# Patient Record
Sex: MALE | Race: ASIAN | NOT HISPANIC OR LATINO | URBAN - METROPOLITAN AREA
[De-identification: names, ages, dates, MRNs, and addresses within clinical notes are randomized per-mention and may not be internally consistent; named-entity substitution may affect disease eponyms.]

---

## 2017-12-18 ENCOUNTER — OUTPATIENT (OUTPATIENT)
Dept: OUTPATIENT SERVICES | Facility: HOSPITAL | Age: 67
LOS: 1 days | End: 2017-12-18
Payer: COMMERCIAL

## 2017-12-18 VITALS
RESPIRATION RATE: 18 BRPM | OXYGEN SATURATION: 98 % | WEIGHT: 145.06 LBS | SYSTOLIC BLOOD PRESSURE: 111 MMHG | DIASTOLIC BLOOD PRESSURE: 59 MMHG | HEART RATE: 75 BPM | TEMPERATURE: 98 F | HEIGHT: 67 IN

## 2017-12-18 DIAGNOSIS — H26.9 UNSPECIFIED CATARACT: Chronic | ICD-10-CM

## 2017-12-18 DIAGNOSIS — H40.9 UNSPECIFIED GLAUCOMA: Chronic | ICD-10-CM

## 2017-12-18 DIAGNOSIS — R94.01 ABNORMAL ELECTROENCEPHALOGRAM [EEG]: ICD-10-CM

## 2017-12-18 DIAGNOSIS — Z95.1 PRESENCE OF AORTOCORONARY BYPASS GRAFT: Chronic | ICD-10-CM

## 2017-12-18 DIAGNOSIS — I25.10 ATHEROSCLEROTIC HEART DISEASE OF NATIVE CORONARY ARTERY WITHOUT ANGINA PECTORIS: Chronic | ICD-10-CM

## 2017-12-18 DIAGNOSIS — Z98.49 CATARACT EXTRACTION STATUS, UNSPECIFIED EYE: Chronic | ICD-10-CM

## 2017-12-18 LAB
ALBUMIN SERPL ELPH-MCNC: 4.1 G/DL — SIGNIFICANT CHANGE UP (ref 3.3–5)
ALP SERPL-CCNC: 50 U/L — SIGNIFICANT CHANGE UP (ref 40–120)
ALT FLD-CCNC: 20 U/L RC — SIGNIFICANT CHANGE UP (ref 10–45)
ANION GAP SERPL CALC-SCNC: 10 MMOL/L — SIGNIFICANT CHANGE UP (ref 5–17)
AST SERPL-CCNC: 23 U/L — SIGNIFICANT CHANGE UP (ref 10–40)
BILIRUB SERPL-MCNC: 0.4 MG/DL — SIGNIFICANT CHANGE UP (ref 0.2–1.2)
BUN SERPL-MCNC: 23 MG/DL — SIGNIFICANT CHANGE UP (ref 7–23)
CALCIUM SERPL-MCNC: 9 MG/DL — SIGNIFICANT CHANGE UP (ref 8.4–10.5)
CHLORIDE SERPL-SCNC: 101 MMOL/L — SIGNIFICANT CHANGE UP (ref 96–108)
CO2 SERPL-SCNC: 26 MMOL/L — SIGNIFICANT CHANGE UP (ref 22–31)
CREAT SERPL-MCNC: 1.03 MG/DL — SIGNIFICANT CHANGE UP (ref 0.5–1.3)
GLUCOSE SERPL-MCNC: 130 MG/DL — HIGH (ref 70–99)
HCT VFR BLD CALC: 38.4 % — LOW (ref 39–50)
HGB BLD-MCNC: 13.3 G/DL — SIGNIFICANT CHANGE UP (ref 13–17)
MCHC RBC-ENTMCNC: 34.2 PG — HIGH (ref 27–34)
MCHC RBC-ENTMCNC: 34.6 GM/DL — SIGNIFICANT CHANGE UP (ref 32–36)
MCV RBC AUTO: 99 FL — SIGNIFICANT CHANGE UP (ref 80–100)
PLATELET # BLD AUTO: 148 K/UL — LOW (ref 150–400)
POTASSIUM SERPL-MCNC: 4.2 MMOL/L — SIGNIFICANT CHANGE UP (ref 3.5–5.3)
POTASSIUM SERPL-SCNC: 4.2 MMOL/L — SIGNIFICANT CHANGE UP (ref 3.5–5.3)
PROT SERPL-MCNC: 7.1 G/DL — SIGNIFICANT CHANGE UP (ref 6–8.3)
RBC # BLD: 3.88 M/UL — LOW (ref 4.2–5.8)
RBC # FLD: 11.3 % — SIGNIFICANT CHANGE UP (ref 10.3–14.5)
SODIUM SERPL-SCNC: 137 MMOL/L — SIGNIFICANT CHANGE UP (ref 135–145)
WBC # BLD: 7.1 K/UL — SIGNIFICANT CHANGE UP (ref 3.8–10.5)
WBC # FLD AUTO: 7.1 K/UL — SIGNIFICANT CHANGE UP (ref 3.8–10.5)

## 2017-12-18 PROCEDURE — 93455 CORONARY ART/GRFT ANGIO S&I: CPT

## 2017-12-18 PROCEDURE — 80053 COMPREHEN METABOLIC PANEL: CPT

## 2017-12-18 PROCEDURE — C1887: CPT

## 2017-12-18 PROCEDURE — 99152 MOD SED SAME PHYS/QHP 5/>YRS: CPT

## 2017-12-18 PROCEDURE — C1769: CPT

## 2017-12-18 PROCEDURE — 85027 COMPLETE CBC AUTOMATED: CPT

## 2017-12-18 PROCEDURE — 93010 ELECTROCARDIOGRAM REPORT: CPT

## 2017-12-18 PROCEDURE — C1894: CPT

## 2017-12-18 PROCEDURE — 93005 ELECTROCARDIOGRAM TRACING: CPT

## 2017-12-18 PROCEDURE — 93455 CORONARY ART/GRFT ANGIO S&I: CPT | Mod: 26

## 2017-12-18 RX ORDER — SODIUM CHLORIDE 9 MG/ML
3 INJECTION INTRAMUSCULAR; INTRAVENOUS; SUBCUTANEOUS EVERY 8 HOURS
Qty: 0 | Refills: 0 | Status: DISCONTINUED | OUTPATIENT
Start: 2017-12-18 | End: 2018-01-02

## 2017-12-18 RX ORDER — METOPROLOL TARTRATE 50 MG
1 TABLET ORAL
Qty: 0 | Refills: 0 | COMMUNITY

## 2017-12-18 NOTE — H&P CARDIOLOGY - PSH
Bilateral cataracts  and glaucoma surgery  2009  CAD S/P percutaneous coronary angioplasty    Glaucoma    S/P CABG x 3  2008  S/P cataract extraction and insertion of intraocular lens

## 2017-12-18 NOTE — H&P CARDIOLOGY - HISTORY OF PRESENT ILLNESS
68 yo male with PMHx of CAD, CABG x3 (Dr. Zapata in 2008), stents in prox circ & distal circ in 2015, brachytherapy in [facility] on 11/2016, HTN, HLD, DM2 (AIC around 7.3 managed by PMD), BPH presents for cardiac cath. pt reports he has been experiencing GAN for a while, evaluated by Dr. Castillo, then referred for cath after seeing decreased EF by Echocardiogram (EF 45-50% minimal MR, mild TR, mild FL, PA systolic pressure 26-31mmHg), reports he gets dizziness and nausea occasionally.

## 2017-12-18 NOTE — H&P CARDIOLOGY - PMH
BPH (benign prostatic hyperplasia)    CAD (coronary artery disease)    Diabetes    Glaucoma    HLD (hyperlipidemia)    HTN (hypertension)    Stented coronary artery

## 2017-12-22 ENCOUNTER — APPOINTMENT (OUTPATIENT)
Dept: CARDIOLOGY | Facility: CLINIC | Age: 67
End: 2017-12-22

## 2018-02-13 ENCOUNTER — APPOINTMENT (OUTPATIENT)
Dept: VASCULAR SURGERY | Facility: CLINIC | Age: 68
End: 2018-02-13
Payer: MEDICARE

## 2018-02-13 ENCOUNTER — APPOINTMENT (OUTPATIENT)
Dept: VASCULAR SURGERY | Facility: CLINIC | Age: 68
End: 2018-02-13

## 2018-02-13 VITALS
TEMPERATURE: 98.5 F | HEART RATE: 64 BPM | HEIGHT: 65 IN | BODY MASS INDEX: 23.32 KG/M2 | DIASTOLIC BLOOD PRESSURE: 82 MMHG | SYSTOLIC BLOOD PRESSURE: 126 MMHG | WEIGHT: 140 LBS

## 2018-02-13 DIAGNOSIS — Z82.49 FAMILY HISTORY OF ISCHEMIC HEART DISEASE AND OTHER DISEASES OF THE CIRCULATORY SYSTEM: ICD-10-CM

## 2018-02-13 DIAGNOSIS — Z86.79 PERSONAL HISTORY OF OTHER DISEASES OF THE CIRCULATORY SYSTEM: ICD-10-CM

## 2018-02-13 DIAGNOSIS — E78.5 HYPERLIPIDEMIA, UNSPECIFIED: ICD-10-CM

## 2018-02-13 DIAGNOSIS — Z86.39 PERSONAL HISTORY OF OTHER ENDOCRINE, NUTRITIONAL AND METABOLIC DISEASE: ICD-10-CM

## 2018-02-13 DIAGNOSIS — E11.42 TYPE 2 DIABETES MELLITUS WITH DIABETIC POLYNEUROPATHY: ICD-10-CM

## 2018-02-13 PROCEDURE — 99202 OFFICE O/P NEW SF 15 MIN: CPT

## 2018-02-13 RX ORDER — LANCETS
EACH MISCELLANEOUS
Qty: 100 | Refills: 0 | Status: ACTIVE | COMMUNITY
Start: 2017-10-23

## 2018-02-13 RX ORDER — BLOOD SUGAR DIAGNOSTIC
STRIP MISCELLANEOUS
Qty: 100 | Refills: 0 | Status: ACTIVE | COMMUNITY
Start: 2017-01-23

## 2018-02-13 RX ORDER — ROSUVASTATIN CALCIUM 40 MG/1
40 TABLET, FILM COATED ORAL
Qty: 90 | Refills: 0 | Status: ACTIVE | COMMUNITY
Start: 2017-07-14

## 2018-02-13 RX ORDER — TAMSULOSIN HYDROCHLORIDE 0.4 MG/1
0.4 CAPSULE ORAL
Qty: 90 | Refills: 0 | Status: ACTIVE | COMMUNITY
Start: 2017-07-14

## 2018-02-13 RX ORDER — RANOLAZINE 1000 MG/1
1000 TABLET, FILM COATED, EXTENDED RELEASE ORAL
Qty: 180 | Refills: 0 | Status: ACTIVE | COMMUNITY
Start: 2017-07-14

## 2018-10-01 ENCOUNTER — INPATIENT (INPATIENT)
Facility: HOSPITAL | Age: 68
LOS: 0 days | Discharge: HOME | DRG: 87 | End: 2018-10-02
Attending: NEUROLOGICAL SURGERY | Admitting: NEUROLOGICAL SURGERY
Payer: COMMERCIAL

## 2018-10-01 VITALS
DIASTOLIC BLOOD PRESSURE: 65 MMHG | HEART RATE: 62 BPM | RESPIRATION RATE: 20 BRPM | OXYGEN SATURATION: 99 % | SYSTOLIC BLOOD PRESSURE: 104 MMHG

## 2018-10-01 DIAGNOSIS — S06.5X9A TRAUMATIC SUBDURAL HEMORRHAGE WITH LOSS OF CONSCIOUSNESS OF UNSPECIFIED DURATION, INITIAL ENCOUNTER: ICD-10-CM

## 2018-10-01 DIAGNOSIS — Z98.49 CATARACT EXTRACTION STATUS, UNSPECIFIED EYE: Chronic | ICD-10-CM

## 2018-10-01 DIAGNOSIS — H40.9 UNSPECIFIED GLAUCOMA: Chronic | ICD-10-CM

## 2018-10-01 DIAGNOSIS — I25.10 ATHEROSCLEROTIC HEART DISEASE OF NATIVE CORONARY ARTERY WITHOUT ANGINA PECTORIS: Chronic | ICD-10-CM

## 2018-10-01 DIAGNOSIS — Z95.1 PRESENCE OF AORTOCORONARY BYPASS GRAFT: Chronic | ICD-10-CM

## 2018-10-01 DIAGNOSIS — H26.9 UNSPECIFIED CATARACT: Chronic | ICD-10-CM

## 2018-10-01 PROBLEM — Z95.5 PRESENCE OF CORONARY ANGIOPLASTY IMPLANT AND GRAFT: Chronic | Status: ACTIVE | Noted: 2017-12-18

## 2018-10-01 LAB
ALBUMIN SERPL ELPH-MCNC: 3.7 G/DL — SIGNIFICANT CHANGE UP (ref 3.3–5)
ALP SERPL-CCNC: 48 U/L — SIGNIFICANT CHANGE UP (ref 40–120)
ALT FLD-CCNC: 32 U/L — SIGNIFICANT CHANGE UP (ref 10–45)
ANION GAP SERPL CALC-SCNC: 12 MMOL/L — SIGNIFICANT CHANGE UP (ref 5–17)
APTT BLD: 34.1 SEC — SIGNIFICANT CHANGE UP (ref 27.5–37.4)
AST SERPL-CCNC: 32 U/L — SIGNIFICANT CHANGE UP (ref 10–40)
BASOPHILS # BLD AUTO: 0.1 K/UL — SIGNIFICANT CHANGE UP (ref 0–0.2)
BASOPHILS NFR BLD AUTO: 0.9 % — SIGNIFICANT CHANGE UP (ref 0–2)
BILIRUB SERPL-MCNC: 0.4 MG/DL — SIGNIFICANT CHANGE UP (ref 0.2–1.2)
BLD GP AB SCN SERPL QL: NEGATIVE — SIGNIFICANT CHANGE UP
BUN SERPL-MCNC: 12 MG/DL — SIGNIFICANT CHANGE UP (ref 7–23)
CALCIUM SERPL-MCNC: 9.2 MG/DL — SIGNIFICANT CHANGE UP (ref 8.4–10.5)
CHLORIDE SERPL-SCNC: 102 MMOL/L — SIGNIFICANT CHANGE UP (ref 96–108)
CO2 SERPL-SCNC: 23 MMOL/L — SIGNIFICANT CHANGE UP (ref 22–31)
CREAT SERPL-MCNC: 1.05 MG/DL — SIGNIFICANT CHANGE UP (ref 0.5–1.3)
EOSINOPHIL # BLD AUTO: 0.2 K/UL — SIGNIFICANT CHANGE UP (ref 0–0.5)
EOSINOPHIL NFR BLD AUTO: 2.6 % — SIGNIFICANT CHANGE UP (ref 0–6)
GLUCOSE SERPL-MCNC: 137 MG/DL — HIGH (ref 70–99)
HCT VFR BLD CALC: 32.5 % — LOW (ref 39–50)
HGB BLD-MCNC: 10.9 G/DL — LOW (ref 13–17)
INR BLD: 1.14 RATIO — SIGNIFICANT CHANGE UP (ref 0.88–1.16)
LYMPHOCYTES # BLD AUTO: 2.2 K/UL — SIGNIFICANT CHANGE UP (ref 1–3.3)
LYMPHOCYTES # BLD AUTO: 32.4 % — SIGNIFICANT CHANGE UP (ref 13–44)
MCHC RBC-ENTMCNC: 31.2 PG — SIGNIFICANT CHANGE UP (ref 27–34)
MCHC RBC-ENTMCNC: 33.4 GM/DL — SIGNIFICANT CHANGE UP (ref 32–36)
MCV RBC AUTO: 93.3 FL — SIGNIFICANT CHANGE UP (ref 80–100)
MONOCYTES # BLD AUTO: 0.6 K/UL — SIGNIFICANT CHANGE UP (ref 0–0.9)
MONOCYTES NFR BLD AUTO: 9.1 % — SIGNIFICANT CHANGE UP (ref 2–14)
NEUTROPHILS # BLD AUTO: 3.7 K/UL — SIGNIFICANT CHANGE UP (ref 1.8–7.4)
NEUTROPHILS NFR BLD AUTO: 55 % — SIGNIFICANT CHANGE UP (ref 43–77)
PLATELET # BLD AUTO: 170 K/UL — SIGNIFICANT CHANGE UP (ref 150–400)
POTASSIUM SERPL-MCNC: 4.1 MMOL/L — SIGNIFICANT CHANGE UP (ref 3.5–5.3)
POTASSIUM SERPL-SCNC: 4.1 MMOL/L — SIGNIFICANT CHANGE UP (ref 3.5–5.3)
PROT SERPL-MCNC: 7.3 G/DL — SIGNIFICANT CHANGE UP (ref 6–8.3)
PROTHROM AB SERPL-ACNC: 12.4 SEC — SIGNIFICANT CHANGE UP (ref 9.8–12.7)
RBC # BLD: 3.48 M/UL — LOW (ref 4.2–5.8)
RBC # FLD: 12.2 % — SIGNIFICANT CHANGE UP (ref 10.3–14.5)
RH IG SCN BLD-IMP: POSITIVE — SIGNIFICANT CHANGE UP
SODIUM SERPL-SCNC: 137 MMOL/L — SIGNIFICANT CHANGE UP (ref 135–145)
WBC # BLD: 6.7 K/UL — SIGNIFICANT CHANGE UP (ref 3.8–10.5)
WBC # FLD AUTO: 6.7 K/UL — SIGNIFICANT CHANGE UP (ref 3.8–10.5)

## 2018-10-01 PROCEDURE — 99285 EMERGENCY DEPT VISIT HI MDM: CPT

## 2018-10-01 PROCEDURE — 70450 CT HEAD/BRAIN W/O DYE: CPT | Mod: 26,77

## 2018-10-01 PROCEDURE — 99221 1ST HOSP IP/OBS SF/LOW 40: CPT

## 2018-10-01 PROCEDURE — 70450 CT HEAD/BRAIN W/O DYE: CPT | Mod: 26

## 2018-10-01 RX ORDER — GLUCAGON INJECTION, SOLUTION 0.5 MG/.1ML
1 INJECTION, SOLUTION SUBCUTANEOUS ONCE
Qty: 0 | Refills: 0 | Status: DISCONTINUED | OUTPATIENT
Start: 2018-10-01 | End: 2018-10-02

## 2018-10-01 RX ORDER — ACETAMINOPHEN 500 MG
650 TABLET ORAL EVERY 6 HOURS
Qty: 0 | Refills: 0 | Status: DISCONTINUED | OUTPATIENT
Start: 2018-10-01 | End: 2018-10-02

## 2018-10-01 RX ORDER — INSULIN LISPRO 100/ML
VIAL (ML) SUBCUTANEOUS AT BEDTIME
Qty: 0 | Refills: 0 | Status: DISCONTINUED | OUTPATIENT
Start: 2018-10-01 | End: 2018-10-02

## 2018-10-01 RX ORDER — ACETAMINOPHEN 500 MG
975 TABLET ORAL ONCE
Qty: 0 | Refills: 0 | Status: COMPLETED | OUTPATIENT
Start: 2018-10-01 | End: 2018-10-01

## 2018-10-01 RX ORDER — PANTOPRAZOLE SODIUM 20 MG/1
40 TABLET, DELAYED RELEASE ORAL
Qty: 0 | Refills: 0 | Status: DISCONTINUED | OUTPATIENT
Start: 2018-10-01 | End: 2018-10-02

## 2018-10-01 RX ORDER — LEVETIRACETAM 250 MG/1
500 TABLET, FILM COATED ORAL EVERY 12 HOURS
Qty: 0 | Refills: 0 | Status: DISCONTINUED | OUTPATIENT
Start: 2018-10-01 | End: 2018-10-02

## 2018-10-01 RX ORDER — DEXTROSE 50 % IN WATER 50 %
25 SYRINGE (ML) INTRAVENOUS ONCE
Qty: 0 | Refills: 0 | Status: DISCONTINUED | OUTPATIENT
Start: 2018-10-01 | End: 2018-10-02

## 2018-10-01 RX ORDER — TRAMADOL HYDROCHLORIDE 50 MG/1
25 TABLET ORAL ONCE
Qty: 0 | Refills: 0 | Status: DISCONTINUED | OUTPATIENT
Start: 2018-10-01 | End: 2018-10-01

## 2018-10-01 RX ORDER — SODIUM CHLORIDE 9 MG/ML
1000 INJECTION, SOLUTION INTRAVENOUS
Qty: 0 | Refills: 0 | Status: DISCONTINUED | OUTPATIENT
Start: 2018-10-01 | End: 2018-10-02

## 2018-10-01 RX ORDER — DEXTROSE 50 % IN WATER 50 %
12.5 SYRINGE (ML) INTRAVENOUS ONCE
Qty: 0 | Refills: 0 | Status: DISCONTINUED | OUTPATIENT
Start: 2018-10-01 | End: 2018-10-02

## 2018-10-01 RX ORDER — LISINOPRIL 2.5 MG/1
2.5 TABLET ORAL DAILY
Qty: 0 | Refills: 0 | Status: DISCONTINUED | OUTPATIENT
Start: 2018-10-01 | End: 2018-10-02

## 2018-10-01 RX ORDER — DOCUSATE SODIUM 100 MG
100 CAPSULE ORAL THREE TIMES A DAY
Qty: 0 | Refills: 0 | Status: DISCONTINUED | OUTPATIENT
Start: 2018-10-01 | End: 2018-10-02

## 2018-10-01 RX ORDER — ACETAMINOPHEN 500 MG
1000 TABLET ORAL ONCE
Qty: 0 | Refills: 0 | Status: COMPLETED | OUTPATIENT
Start: 2018-10-01 | End: 2018-10-01

## 2018-10-01 RX ORDER — MORPHINE SULFATE 50 MG/1
2 CAPSULE, EXTENDED RELEASE ORAL ONCE
Qty: 0 | Refills: 0 | Status: DISCONTINUED | OUTPATIENT
Start: 2018-10-01 | End: 2018-10-01

## 2018-10-01 RX ORDER — ATORVASTATIN CALCIUM 80 MG/1
20 TABLET, FILM COATED ORAL AT BEDTIME
Qty: 0 | Refills: 0 | Status: DISCONTINUED | OUTPATIENT
Start: 2018-10-01 | End: 2018-10-02

## 2018-10-01 RX ORDER — METOPROLOL TARTRATE 50 MG
25 TABLET ORAL DAILY
Qty: 0 | Refills: 0 | Status: DISCONTINUED | OUTPATIENT
Start: 2018-10-01 | End: 2018-10-02

## 2018-10-01 RX ORDER — DEXTROSE 50 % IN WATER 50 %
15 SYRINGE (ML) INTRAVENOUS ONCE
Qty: 0 | Refills: 0 | Status: DISCONTINUED | OUTPATIENT
Start: 2018-10-01 | End: 2018-10-02

## 2018-10-01 RX ORDER — DESMOPRESSIN ACETATE 0.1 MG/1
26 TABLET ORAL ONCE
Qty: 0 | Refills: 0 | Status: COMPLETED | OUTPATIENT
Start: 2018-10-01 | End: 2018-10-01

## 2018-10-01 RX ORDER — DEXTROSE MONOHYDRATE, SODIUM CHLORIDE, AND POTASSIUM CHLORIDE 50; .745; 4.5 G/1000ML; G/1000ML; G/1000ML
1000 INJECTION, SOLUTION INTRAVENOUS
Qty: 0 | Refills: 0 | Status: DISCONTINUED | OUTPATIENT
Start: 2018-10-01 | End: 2018-10-02

## 2018-10-01 RX ORDER — DESMOPRESSIN ACETATE 0.1 MG/1
26 TABLET ORAL ONCE
Qty: 0 | Refills: 0 | Status: DISCONTINUED | OUTPATIENT
Start: 2018-10-01 | End: 2018-10-01

## 2018-10-01 RX ORDER — TIMOLOL 0.5 %
1 DROPS OPHTHALMIC (EYE) DAILY
Qty: 0 | Refills: 0 | Status: DISCONTINUED | OUTPATIENT
Start: 2018-10-01 | End: 2018-10-02

## 2018-10-01 RX ORDER — TAMSULOSIN HYDROCHLORIDE 0.4 MG/1
0.4 CAPSULE ORAL AT BEDTIME
Qty: 0 | Refills: 0 | Status: DISCONTINUED | OUTPATIENT
Start: 2018-10-01 | End: 2018-10-02

## 2018-10-01 RX ORDER — ONDANSETRON 8 MG/1
4 TABLET, FILM COATED ORAL EVERY 6 HOURS
Qty: 0 | Refills: 0 | Status: DISCONTINUED | OUTPATIENT
Start: 2018-10-01 | End: 2018-10-02

## 2018-10-01 RX ORDER — DEXAMETHASONE 0.5 MG/5ML
4 ELIXIR ORAL EVERY 12 HOURS
Qty: 0 | Refills: 0 | Status: DISCONTINUED | OUTPATIENT
Start: 2018-10-01 | End: 2018-10-02

## 2018-10-01 RX ORDER — RANOLAZINE 500 MG/1
1000 TABLET, FILM COATED, EXTENDED RELEASE ORAL
Qty: 0 | Refills: 0 | Status: DISCONTINUED | OUTPATIENT
Start: 2018-10-01 | End: 2018-10-02

## 2018-10-01 RX ORDER — INSULIN LISPRO 100/ML
VIAL (ML) SUBCUTANEOUS
Qty: 0 | Refills: 0 | Status: DISCONTINUED | OUTPATIENT
Start: 2018-10-01 | End: 2018-10-02

## 2018-10-01 RX ORDER — SENNA PLUS 8.6 MG/1
2 TABLET ORAL AT BEDTIME
Qty: 0 | Refills: 0 | Status: DISCONTINUED | OUTPATIENT
Start: 2018-10-01 | End: 2018-10-02

## 2018-10-01 RX ADMIN — TRAMADOL HYDROCHLORIDE 25 MILLIGRAM(S): 50 TABLET ORAL at 16:06

## 2018-10-01 RX ADMIN — Medication 1000 MILLIGRAM(S): at 15:57

## 2018-10-01 RX ADMIN — TRAMADOL HYDROCHLORIDE 25 MILLIGRAM(S): 50 TABLET ORAL at 17:38

## 2018-10-01 RX ADMIN — Medication 400 MILLIGRAM(S): at 13:49

## 2018-10-01 RX ADMIN — DESMOPRESSIN ACETATE 226 MICROGRAM(S): 0.1 TABLET ORAL at 14:40

## 2018-10-01 RX ADMIN — LEVETIRACETAM 500 MILLIGRAM(S): 250 TABLET, FILM COATED ORAL at 20:43

## 2018-10-01 RX ADMIN — Medication 4 MILLIGRAM(S): at 16:52

## 2018-10-01 RX ADMIN — Medication 650 MILLIGRAM(S): at 21:23

## 2018-10-01 RX ADMIN — Medication 650 MILLIGRAM(S): at 20:44

## 2018-10-01 NOTE — ED PROVIDER NOTE - MEDICAL DECISION MAKING DETAILS
67 yo M c PMH of CAD s/p CABG and stents (on ASA and plavix), HTN, HLD presents 1 week s/p mechanical fall with 4 day hx of L temporal HA. Pt slipped on wet surface after getting out of shower, +head trauma, no LOC, no pain other than constant L parietal RUSSO. On exam, /65 VS otherwise wnl, pupils unequal, L pupil 4 mm not reactive to light R pupil 3 mm reactive, this may be 2/2 recent cataract surgery in L eye given no other focal neuro deficits. CTH pending, neuro to see. will reassess 69 yo M c PMH of CAD s/p CABG and stents (on ASA and plavix), HTN, HLD presents 1 week s/p mechanical fall with 4 day hx of L temporal HA. Pt slipped on wet surface after getting out of shower, +head trauma, no LOC, no pain other than constant L parietal RUSSO. On exam, /65 VS otherwise wnl, pupils unequal, L pupil 4 mm not reactive to light R pupil 3 mm reactive, this may be 2/2 recent cataract surgery in L eye given no other focal neuro deficits. CTH pending, neuro to see. will reassess ZR

## 2018-10-01 NOTE — H&P ADULT - ATTENDING COMMENTS
I have personally seen and examined Mr. Thomas. Agree with the above. Discussed options, risks and benefits with the patient and his daughter - Dr. Martin.

## 2018-10-01 NOTE — ED PROVIDER NOTE - PHYSICAL EXAMINATION
spine: no midline c/t/l spinal ttp or stepoffs, from of neck without pain  NEURO: EOMI (CN III, IV, VI), facial sensation intact to light touch in all 3 divisions bilat (CN V), face is symmetric with normal eye closure, eye opening, and smile (CN VII), hearing is normal to rubbing fingers (CN VII), palate elevates symmetrically, phonation is normal (CN IX, X),  shoulder shrug intact bilat (CN XI), tongue is midline with nl movements and no atrophy (CN XII), finger to nose test nl bilat, negative pronator drift bilat,  speech is clear; 5/5 motor strength BUE and BLE: deltoids, biceps, triceps, wrist flexors/extensors, hand , hip flexors, knee flexors/extensors, plantar/dorsiflexors, hallux flexors/extensors; sensation intact to light touch BUE and BLE: C5-T1 and L3-S1; gait wnl  pupils: L pupil 3 mm not reactive to light, R pupil 2 mm reactive to light

## 2018-10-01 NOTE — ED PROVIDER NOTE - OBJECTIVE STATEMENT
69 yo M c PMH of CAD s/p CABG and stents (on ASA and plavix), HTN, HLD presents 1 week s/p mechanical fall with 4 day hx of L temporal HA. Pt slipped on wet surface after getting out of shower, +head trauma, no LOC, no pain other than constant L parietal HA that started after fall somewhat alleviated by tylenol. Pt denies preceding dizziness or CP. Pt had L eye surgery in the past couple weeks for cataracts.    ROS positive: HA, fever 100F, nausea  ROS negative: congestion, pharyngitis, CP, SOB, vomiting, abdominal pain, dyusuria, hematuria

## 2018-10-01 NOTE — ED ADULT NURSE REASSESSMENT NOTE - NS ED NURSE REASSESS COMMENT FT1
patient admittied to NSCU, waiting for bed assignment. pt a/ox3 continuing to verbalizing headache. md aware noted hypotensive: 84/52 repeat /52, md aware, Neurosurgery paged will monitor.

## 2018-10-01 NOTE — ED ADULT NURSE NOTE - OBJECTIVE STATEMENT
68year old male a/ox3 brought in by ambulance from private md office s/p trip and fall about 1 week ago. pt states he was in his bathroom when he slipped and hit his head, denies loc. has been having persistent constant headaches and went he went to md office today, noted with abnormal pupils and sent here. no gross neuro deficits. no change in vision/n/v/change in gait respirations even unlabored no sob/dyspnea daughter at bedside.

## 2018-10-01 NOTE — ED PROVIDER NOTE - PROGRESS NOTE DETAILS
Art NYE: received call from radiology pt has 1.9 SDH with 3 mm R shift neurosurg paged awaiting callback Art NYE: per neurosurg recs ordered 0.4 mcg/kg of DDAVP awaiting consent from pt to administer 1 uplt per neurosurg recs Art NYE: per neurosurg pt does not require plt will admit to neurosurg icu

## 2018-10-01 NOTE — ED ADULT NURSE REASSESSMENT NOTE - NS ED NURSE REASSESS COMMENT FT1
patient verbalizing chest pressure 6/10, Neurosurgery aware and to come down to evaluate. EKG completed. will monitor.

## 2018-10-01 NOTE — H&P ADULT - NSHPPHYSICALEXAM_GEN_ALL_CORE
AOx3, FC, PERRL, EOMI, V1-3 intact, no facial, palate ron symmetric, tongue midline, shrug 5/5  5/5 throughout, no drift  SILT  No clonus or babinski

## 2018-10-01 NOTE — CONSULT NOTE ADULT - SUBJECTIVE AND OBJECTIVE BOX
Neurology Consult  Name  MICHEL CULP    HPI:  Mr. Culp is a 68M with PMH of CAD (s/p CABG in 2008) and stent placement in 2014 (on ASA and Plavix), HTN, and HLD who presented to the ED after having a mechanical fall 1 week ago. The patient reportedly slipped while getting out of the shower. He experienced head trauma but no LOC; however, he endorsed a L temporal/parietal HA for the past 4 days. His PCP saw the patient this morning and reportedly noted some unsteadiness on his feet, which prompted this hospital visit. Neurology was consulted to evaluate for possible subdural hemorrhage.    The patient was seen at bedside this afternoon and was resting comfortably. He endorsed headache but denied other complaints at this time, including blurry or double vision, ringing in the ears, or pain or weakness of the extremities.     MEDICATIONS  (STANDING):  atorvastatin 20 milliGRAM(s) Oral at bedtime  dexamethasone     Tablet 4 milliGRAM(s) Oral every 12 hours  dextrose 5%. 1000 milliLiter(s) (50 mL/Hr) IV Continuous <Continuous>  dextrose 50% Injectable 12.5 Gram(s) IV Push once  dextrose 50% Injectable 25 Gram(s) IV Push once  dextrose 50% Injectable 25 Gram(s) IV Push once  docusate sodium 100 milliGRAM(s) Oral three times a day  insulin lispro (HumaLOG) corrective regimen sliding scale   SubCutaneous three times a day before meals  insulin lispro (HumaLOG) corrective regimen sliding scale   SubCutaneous at bedtime  levETIRAcetam 500 milliGRAM(s) Oral every 12 hours  lisinopril 2.5 milliGRAM(s) Oral daily  metoprolol succinate ER 25 milliGRAM(s) Oral daily  pantoprazole    Tablet 40 milliGRAM(s) Oral before breakfast  ranolazine 1000 milliGRAM(s) Oral two times a day  sodium chloride 0.9% with potassium chloride 20 mEq/L 1000 milliLiter(s) (75 mL/Hr) IV Continuous <Continuous>  tamsulosin 0.4 milliGRAM(s) Oral at bedtime  timolol 0.5% Solution 1 Drop(s) Both EYES daily  traMADol 25 milliGRAM(s) Oral once    MEDICATIONS  (PRN):  acetaminophen   Tablet .. 650 milliGRAM(s) Oral every 6 hours PRN Temp greater or equal to 38C (100.4F), Mild Pain (1 - 3)  dextrose 40% Gel 15 Gram(s) Oral once PRN Blood Glucose LESS THAN 70 milliGRAM(s)/deciliter  glucagon  Injectable 1 milliGRAM(s) IntraMuscular once PRN Glucose LESS THAN 70 milligrams/deciliter  ondansetron   Disintegrating Tablet 4 milliGRAM(s) Oral every 6 hours PRN Nausea  senna 2 Tablet(s) Oral at bedtime PRN Constipation    ALLERGIES  NKDA    PSH:  Cataract surgery (2009)    Objective:   Vital Signs Last 24 Hrs  T(C): 36.8 (01 Oct 2018 12:24), Max: 36.8 (01 Oct 2018 12:24)  T(F): 98.2 (01 Oct 2018 12:24), Max: 98.2 (01 Oct 2018 12:24)  HR: 66 (01 Oct 2018 14:49) (62 - 66)  BP: 106/73 (01 Oct 2018 14:49) (104/65 - 116/65)  BP(mean): --  RR: 17 (01 Oct 2018 14:49) (17 - 20)  SpO2: 100% (01 Oct 2018 14:49) (99% - 100%)    General Exam:   General appearance: No acute distress; well-nourished                   Neurological Exam:  Mental Status: AAOx3, fluent speech, follows commands  Cranial Nerves: L pupil larger than right; EOMI, PERRL, V1-V3 intact, facial symmetry intact, no dysarthria  Motor: 5/5 throughout. No drift x4  Sensation: Intact to LT throughout  Coordination: FTN intact b/l  Reflexes: trace bilateral biceps, brachioradialis, patellar and ankle    Labs:    10-01    137  |  102  |  12  ----------------------------<  137<H>  4.1   |  23  |  1.05    Ca    9.2      01 Oct 2018 13:27    TPro  7.3  /  Alb  3.7  /  TBili  0.4  /  DBili  x   /  AST  32  /  ALT  32  /  AlkPhos  48  10-01    LIVER FUNCTIONS - ( 01 Oct 2018 13:27 )  Alb: 3.7 g/dL / Pro: 7.3 g/dL / ALK PHOS: 48 U/L / ALT: 32 U/L / AST: 32 U/L / GGT: x           CBC Full  -  ( 01 Oct 2018 13:27 )  WBC Count : 6.7 K/uL  Hemoglobin : 10.9 g/dL  Hematocrit : 32.5 %  Platelet Count - Automated : 170 K/uL  Mean Cell Volume : 93.3 fl  Mean Cell Hemoglobin : 31.2 pg  Mean Cell Hemoglobin Concentration : 33.4 gm/dL  Auto Neutrophil # : 3.7 K/uL  Auto Lymphocyte # : 2.2 K/uL  Auto Monocyte # : 0.6 K/uL  Auto Eosinophil # : 0.2 K/uL  Auto Basophil # : 0.1 K/uL  Auto Neutrophil % : 55.0 %  Auto Lymphocyte % : 32.4 %  Auto Monocyte % : 9.1 %  Auto Eosinophil % : 2.6 %  Auto Basophil % : 0.9 %      Radiology  CTH: "Acute on chronic left frontal parietal and temporal subdural hematoma measuring up to 1.9 cm in greatest thickness and resulting in rightward shift of 3 mm."

## 2018-10-01 NOTE — H&P ADULT - HISTORY OF PRESENT ILLNESS
67 yo M c PMH of CAD s/p CABG 2008 and stents 2014 (on ASA and plavix last taken 9/30/2018), HTN, HLD presents s/p mech fall 1 week prior with 4 day hx of L temporal HA. Pt slipped on wet surface after getting out of shower, +head trauma, no LOC, no pain other than constant L parietal HA that started after fall somewhat alleviated by tylenol. Pt denies preceding dizziness or CP. Patient was seen by PMD today who was concerned that he was unsteady on his feet so he was sent into the hospital.

## 2018-10-01 NOTE — CONSULT NOTE ADULT - SUBJECTIVE AND OBJECTIVE BOX
CHIEF COMPLAINT: Headache    HISTORY OF PRESENT ILLNESS: 67 yo male w h/o CAD s/p CABG (), HTN, HLD who presented to ED after mechanical slip and fall one week ago with headache. Pt reports he was in the shower when he slipped and hit his head. He denies any loss of consciousness. He did not feel dizzy or lightheaded prior to the fall. He came to the ED because of worsening headache. He reports compliance with his home medications.      Allergies    No Known Allergies    Intolerances    	    MEDICATIONS:  lisinopril 2.5 milliGRAM(s) Oral daily  metoprolol succinate ER 25 milliGRAM(s) Oral daily  ranolazine 1000 milliGRAM(s) Oral two times a day  tamsulosin 0.4 milliGRAM(s) Oral at bedtime        acetaminophen   Tablet .. 650 milliGRAM(s) Oral every 6 hours PRN  levETIRAcetam 500 milliGRAM(s) Oral every 12 hours  ondansetron   Disintegrating Tablet 4 milliGRAM(s) Oral every 6 hours PRN    docusate sodium 100 milliGRAM(s) Oral three times a day  pantoprazole    Tablet 40 milliGRAM(s) Oral before breakfast  senna 2 Tablet(s) Oral at bedtime PRN    atorvastatin 20 milliGRAM(s) Oral at bedtime  dexamethasone     Tablet 4 milliGRAM(s) Oral every 12 hours  dextrose 40% Gel 15 Gram(s) Oral once PRN  dextrose 50% Injectable 12.5 Gram(s) IV Push once  dextrose 50% Injectable 25 Gram(s) IV Push once  dextrose 50% Injectable 25 Gram(s) IV Push once  glucagon  Injectable 1 milliGRAM(s) IntraMuscular once PRN  insulin lispro (HumaLOG) corrective regimen sliding scale   SubCutaneous three times a day before meals  insulin lispro (HumaLOG) corrective regimen sliding scale   SubCutaneous at bedtime    dextrose 5%. 1000 milliLiter(s) IV Continuous <Continuous>  sodium chloride 0.9% with potassium chloride 20 mEq/L 1000 milliLiter(s) IV Continuous <Continuous>  timolol 0.5% Solution 1 Drop(s) Both EYES daily      PAST MEDICAL & SURGICAL HISTORY:  Stented coronary artery  Glaucoma  BPH (benign prostatic hyperplasia)  HLD (hyperlipidemia)  HTN (hypertension)  Diabetes  CAD (coronary artery disease)  Glaucoma  CAD S/P percutaneous coronary angioplasty  S/P cataract extraction and insertion of intraocular lens  Bilateral cataracts: and glaucoma surgery    S/P CABG x 3: 2008      FAMILY HISTORY:  Family history of heart attack (Father):  age 73      SOCIAL HISTORY:    Non-smoker        REVIEW OF SYSTEMS:  General: no fatigue/malaise, weight loss/gain.  Skin: no rashes.  Ophthalmologic: no blurred vision, no loss of vision. 	  ENT: no sore throat, rhinorrhea, sinus congestion.  Respiratory: no SOB, cough or wheeze.  Gastrointestinal:  no N/V/D, no melena/hematemesis/hematochezia.  Genitourinary: no dysuria/hesitancy or hematuria.  Musculoskeletal: no myalgias or arthralgias.  Neurological: Headache. no changes in vision or hearing, no lightheadedness/dizziness, no syncope/near syncope	  Psychiatric: no unusual stress/anxiety.   Hematology/Lymphatics: no unusual bleeding, bruising and no lymphadenopathy.  Endocrine: no unusual thirst.   All others negative except as stated above and in HPI.    PHYSICAL EXAM:  T(C): 36.8 (10-01-18 @ 12:24), Max: 36.8 (10-01-18 @ 12:24)  HR: 66 (10-01-18 @ 14:49) (62 - 66)  BP: 106/73 (10-01-18 @ 14:49) (104/65 - 116/65)  RR: 17 (10-01-18 @ 14:49) (17 - 20)  SpO2: 100% (10-01-18 @ 14:49) (99% - 100%)  Wt(kg): --  I&O's Summary      Appearance: Normal	  HEENT:   Normal oral mucosa, PERRL, EOMI. b/l suborbital ecchymoses	  Lymphatic: No lymphadenopathy  Cardiovascular: Normal S1 S2, No JVD, No murmurs, No edema  Respiratory: Lungs clear to auscultation	  Psychiatry: A & O x 3, Mood & affect appropriate  Gastrointestinal:  Soft, Non-tender, + BS	  Skin: No rashes, No ecchymoses, No cyanosis	  Neurologic: Non-focal  Extremities: Normal range of motion, No clubbing, cyanosis or edema  Vascular: Peripheral pulses palpable 2+ bilaterally        LABS:	 	    CBC Full  -  ( 01 Oct 2018 13:27 )  WBC Count : 6.7 K/uL  Hemoglobin : 10.9 g/dL  Hematocrit : 32.5 %  Platelet Count - Automated : 170 K/uL  Mean Cell Volume : 93.3 fl  Mean Cell Hemoglobin : 31.2 pg  Mean Cell Hemoglobin Concentration : 33.4 gm/dL  Auto Neutrophil # : 3.7 K/uL  Auto Lymphocyte # : 2.2 K/uL  Auto Monocyte # : 0.6 K/uL  Auto Eosinophil # : 0.2 K/uL  Auto Basophil # : 0.1 K/uL  Auto Neutrophil % : 55.0 %  Auto Lymphocyte % : 32.4 %  Auto Monocyte % : 9.1 %  Auto Eosinophil % : 2.6 %  Auto Basophil % : 0.9 %    10-01    137  |  102  |  12  ----------------------------<  137<H>  4.1   |  23  |  1.05    Ca    9.2      01 Oct 2018 13:27    TPro  7.3  /  Alb  3.7  /  TBili  0.4  /  DBili  x   /  AST  32  /  ALT  32  /  AlkPhos  48  10-01      proBNP:   Lipid Profile:   HgA1c:   TSH:       CARDIAC MARKERS:            TELEMETRY: 	    ECG:  	Sinus rhythm HR  79 T wave inv II, V4-V6  RADIOLOGY: < from: CT Head No Cont (10.01.18 @ 12:46) >  Impression:    Acute on chronic left frontal parietal and temporal subdural hematoma   measuring up to 1.9 cm in greatest thickness and resulting in rightward   shift of 3 mm.    < end of copied text >    OTHER: 	    PREVIOUS DIAGNOSTIC TESTING:    [ ] Echocardiogram:   [ ]  Catheterization: < from: Cardiac Cath Lab - Adult (17 @ 12:50) >  CORONARY VESSELS: The coronary circulation is right dominant.  LM:   --  LM: Angiography showed moderate atherosclerosis.  LAD:   --  Proximal LAD: There was a 100 % stenosis.  CX:   --  Proximal circumflex: There was a 100 % stenosis.  RCA:   --  Proximal RCA: There was a 100 % stenosis.  GRAFTS:   --  Graft to the LAD: The graft was a LIMA. Graft angiography  showed no evidence of disease.    < end of copied text >    [ ] Stress Test: CHIEF COMPLAINT: Headache    HISTORY OF PRESENT ILLNESS: 67 yo male w h/o CAD s/p CABG (), HTN, HLD who presented to ED after mechanical slip and fall one week ago with headache. Pt reports he was in the shower when he slipped and hit his head. He denies any loss of consciousness. He did not feel dizzy or lightheaded prior to the fall. He came to the ED because of worsening headache. He reports compliance with his home medications.    Allergies  No Known Allergies    MEDICATIONS:  lisinopril 2.5 milliGRAM(s) Oral daily  metoprolol succinate ER 25 milliGRAM(s) Oral daily  ranolazine 1000 milliGRAM(s) Oral two times a day  tamsulosin 0.4 milliGRAM(s) Oral at bedtime    acetaminophen   Tablet .. 650 milliGRAM(s) Oral every 6 hours PRN  levETIRAcetam 500 milliGRAM(s) Oral every 12 hours  ondansetron   Disintegrating Tablet 4 milliGRAM(s) Oral every 6 hours PRN    docusate sodium 100 milliGRAM(s) Oral three times a day  pantoprazole    Tablet 40 milliGRAM(s) Oral before breakfast  senna 2 Tablet(s) Oral at bedtime PRN    atorvastatin 20 milliGRAM(s) Oral at bedtime  dexamethasone     Tablet 4 milliGRAM(s) Oral every 12 hours  dextrose 40% Gel 15 Gram(s) Oral once PRN  dextrose 50% Injectable 12.5 Gram(s) IV Push once  dextrose 50% Injectable 25 Gram(s) IV Push once  dextrose 50% Injectable 25 Gram(s) IV Push once  glucagon  Injectable 1 milliGRAM(s) IntraMuscular once PRN  insulin lispro (HumaLOG) corrective regimen sliding scale   SubCutaneous three times a day before meals  insulin lispro (HumaLOG) corrective regimen sliding scale   SubCutaneous at bedtime    dextrose 5%. 1000 milliLiter(s) IV Continuous <Continuous>  sodium chloride 0.9% with potassium chloride 20 mEq/L 1000 milliLiter(s) IV Continuous <Continuous>  timolol 0.5% Solution 1 Drop(s) Both EYES daily    PAST MEDICAL & SURGICAL HISTORY:  Stented coronary artery  Glaucoma  BPH (benign prostatic hyperplasia)  HLD (hyperlipidemia)  HTN (hypertension)  Diabetes  CAD (coronary artery disease)  Glaucoma  CAD S/P percutaneous coronary angioplasty  S/P cataract extraction and insertion of intraocular lens  Bilateral cataracts: and glaucoma surgery    S/P CABG x 3: 2008    FAMILY HISTORY:  Family history of heart attack (Father):  age 73    SOCIAL HISTORY:    Non-smoker    REVIEW OF SYSTEMS:  General: no fatigue/malaise, weight loss/gain.  Skin: no rashes.  Ophthalmologic: no blurred vision, no loss of vision. 	  ENT: no sore throat, rhinorrhea, sinus congestion. Positive bruising under eyes.   Respiratory: no SOB, cough or wheeze.  Gastrointestinal:  no N/V/D, no melena/hematemesis/hematochezia.  Genitourinary: no dysuria/hesitancy or hematuria.  Musculoskeletal: no myalgias or arthralgias.  Neurological: Headache. no changes in vision or hearing, no lightheadedness/dizziness, no syncope/near syncope	  Psychiatric: no unusual stress/anxiety.   Hematology/Lymphatics: no unusual bleeding, bruising and no lymphadenopathy.  Endocrine: no unusual thirst.   All others negative except as stated above and in HPI.    PHYSICAL EXAM:  T(C): 36.8 (10-01-18 @ 12:24), Max: 36.8 (10-01-18 @ 12:24)  HR: 66 (10-01-18 @ 14:49) (62 - 66)  BP: 106/73 (10-01-18 @ 14:49) (104/65 - 116/65)  RR: 17 (10-01-18 @ 14:49) (17 - 20)  SpO2: 100% (10-01-18 @ 14:49) (99% - 100%)    Appearance: Normal	  HEENT:   Normal oral mucosa,  b/l suborbital ecchymoses	  Lymphatic: No lymphadenopathy  Cardiovascular: Normal S1 S2, No JVD,  No edema  Respiratory: Lungs clear to auscultation	  Psychiatry: A & O x 3,   Gastrointestinal:  Soft, Non-tender	  Skin: No rashes, No ecchymoses, No cyanosis	  Neurologic: Non-focal  Extremities: No clubbing, cyanosis or edema  Vascular: Peripheral pulses palpable 2+ bilaterally    LABS:	 	    CBC Full  -  ( 01 Oct 2018 13:27 )  WBC Count : 6.7 K/uL  Hemoglobin : 10.9 g/dL  Hematocrit : 32.5 %  Platelet Count - Automated : 170 K/uL  Mean Cell Volume : 93.3 fl  Mean Cell Hemoglobin : 31.2 pg  Mean Cell Hemoglobin Concentration : 33.4 gm/dL  Auto Neutrophil # : 3.7 K/uL  Auto Lymphocyte # : 2.2 K/uL  Auto Monocyte # : 0.6 K/uL  Auto Eosinophil # : 0.2 K/uL  Auto Basophil # : 0.1 K/uL  Auto Neutrophil % : 55.0 %  Auto Lymphocyte % : 32.4 %  Auto Monocyte % : 9.1 %  Auto Eosinophil % : 2.6 %  Auto Basophil % : 0.9 %    10-01    137  |  102  |  12  ----------------------------<  137<H>  4.1   |  23  |  1.05    Ca    9.2      01 Oct 2018 13:27    TPro  7.3  /  Alb  3.7  /  TBili  0.4  /  DBili  x   /  AST  32  /  ALT  32  /  AlkPhos  48  10-01    CARDIAC MARKERS:      TELEMETRY: 	    ECG:  	Sinus rhythm HR  79 T wave inv II, V4-V6    RADIOLOGY:   < from: CT Head No Cont (10.01.18 @ 12:46) >  Impression:    Acute on chronic left frontal parietal and temporal subdural hematoma   measuring up to 1.9 cm in greatest thickness and resulting in rightward   shift of 3 mm.    < end of copied text >    PREVIOUS DIAGNOSTIC TESTING:    Catheterization: < from: Cardiac Cath Lab - Adult (17 @ 12:50) >  CORONARY VESSELS: The coronary circulation is right dominant.  LM:   --  LM: Angiography showed moderate atherosclerosis.  LAD:   --  Proximal LAD: There was a 100 % stenosis.  CX:   --  Proximal circumflex: There was a 100 % stenosis.  RCA:   --  Proximal RCA: There was a 100 % stenosis.  GRAFTS:   --  Graft to the LAD: The graft was a LIMA. Graft angiography  showed no evidence of disease.    < end of copied text >

## 2018-10-01 NOTE — H&P ADULT - ASSESSMENT
68[facility] and [facility]  - admit to nsgy under Dr. Slade  - [facility] in 4 hours for stability and in 24 hours  - Dec 4 q12 for   - Cardiology eval for holding asa and plavix   - q4hr neurochecks 68M on asa and plavix s/p mech fall 1 week prior here with left sided acute SDH  - Admit to nsgy under Dr. Slade  - [facility] in 4 hours for stability and in 24 hours  - Dec 4 q12 for SDH and headaches  - Cardiology eval for holding asa and plavix   - q4hr neurochecks

## 2018-10-01 NOTE — CONSULT NOTE ADULT - ATTENDING COMMENTS
67 yo male w h/o extensive CAD s/p CABG (2008), HTN, HLD presenting with SHD s/p fall one week prior.  Cardiac pathology includes 100% stenosis of all three vessels LAD, RCA, and LCx with patent LIMA to LAD. Aspirin and clopidogrel held in the setting of new SDH. Recommend resuming aspirin 81 mg daily once stable from a neurologic standpoint, and ideally resuming clopidogrel at a later date given severe CAD. Continue ACEi, BB, ranolazine, increase statin. Will follow along.

## 2018-10-01 NOTE — CONSULT NOTE ADULT - ASSESSMENT
Assessment:  Mr. Thomas is a 68M with PMH of CAD (s/p CABG in 2008) and stent placement in 2014 (on ASA and Plavix), HTN, and HLD who presented to the ED after having a mechanical fall 1 week ago in which he experienced head trauma but no LOC. He has endorsed a L temporal HA for the past 4 days. His PCP saw him this morning and reportedly noted some unsteadiness of gait, which prompted this hospital visit. Neurology was consulted to evaluate for possible subdural hemorrhage. CT of the head confirmed an acute on chronic L frontal, parietal, and temporal subdural hemorrhage.     Plan:  - Admit patient   - Repeat CT scan in at least 12 hours to monitor size of subdural hemorrhage  - Will follow    Note written by Katherine Gaspar, Medical Student ([employer]).  Case discussed with Dr. Barraza. Assessment:  Mr. Thomas is a 68M with PMH of CAD (s/p CABG in 2008) and stent placement in 2014 (on ASA and Plavix), HTN, and HLD who presented to the ED after having a mechanical fall 1 week ago in which he experienced head trauma but no LOC. He has endorsed a L temporal HA for the past 4 days. His PCP saw him this morning and reportedly noted some unsteadiness of gait, which prompted this hospital visit. Neurology was consulted to evaluate for possible subdural hemorrhage. CT of the head confirmed an acute Left parietal, and temporal subdural hemorrhage.     Plan:  - Patient also seen by Neurosurgery/ Agree with their plan of care  - Repeat CT scan in at least 12 hours to monitor size of subdural hemorrhage  - Will follow up tomorrow    Note written by Katherine Gaspar, Medical Student ([employer]).  Case discussed with Dr. Barraza.  Case dw her daughter, Dr. Pires

## 2018-10-01 NOTE — ED ADULT NURSE NOTE - INTERVENTIONS DEFINITIONS
Stretcher in lowest position, wheels locked, appropriate side rails in place/Monitor gait and stability/[place] to call system/Call bell, personal items and telephone within reach/Instruct patient to call for assistance/Physically safe environment: no spills, clutter or unnecessary equipment

## 2018-10-02 ENCOUNTER — TRANSCRIPTION ENCOUNTER (OUTPATIENT)
Age: 68
End: 2018-10-02

## 2018-10-02 VITALS
SYSTOLIC BLOOD PRESSURE: 125 MMHG | OXYGEN SATURATION: 99 % | TEMPERATURE: 98 F | HEART RATE: 62 BPM | DIASTOLIC BLOOD PRESSURE: 63 MMHG | RESPIRATION RATE: 18 BRPM

## 2018-10-02 LAB
GLUCOSE BLDC GLUCOMTR-MCNC: 134 MG/DL — HIGH (ref 70–99)
GLUCOSE BLDC GLUCOMTR-MCNC: 231 MG/DL — HIGH (ref 70–99)
GLUCOSE BLDC GLUCOMTR-MCNC: 361 MG/DL — HIGH (ref 70–99)
HBA1C BLD-MCNC: 7.2 % — HIGH (ref 4–5.6)

## 2018-10-02 PROCEDURE — 99222 1ST HOSP IP/OBS MODERATE 55: CPT

## 2018-10-02 PROCEDURE — 70450 CT HEAD/BRAIN W/O DYE: CPT | Mod: 26

## 2018-10-02 PROCEDURE — 99239 HOSP IP/OBS DSCHRG MGMT >30: CPT

## 2018-10-02 RX ORDER — ASPIRIN/CALCIUM CARB/MAGNESIUM 324 MG
1 TABLET ORAL
Qty: 0 | Refills: 0 | COMMUNITY

## 2018-10-02 RX ORDER — SENNA PLUS 8.6 MG/1
2 TABLET ORAL
Qty: 0 | Refills: 0 | DISCHARGE
Start: 2018-10-02

## 2018-10-02 RX ORDER — ACETAMINOPHEN 500 MG
1000 TABLET ORAL ONCE
Qty: 0 | Refills: 0 | Status: COMPLETED | OUTPATIENT
Start: 2018-10-02 | End: 2018-10-02

## 2018-10-02 RX ORDER — DEXAMETHASONE 0.5 MG/5ML
1 ELIXIR ORAL
Qty: 20 | Refills: 0
Start: 2018-10-02 | End: 2018-10-11

## 2018-10-02 RX ORDER — TRAMADOL HYDROCHLORIDE 50 MG/1
1 TABLET ORAL
Qty: 20 | Refills: 0
Start: 2018-10-02 | End: 2018-10-06

## 2018-10-02 RX ORDER — TRAMADOL HYDROCHLORIDE 50 MG/1
50 TABLET ORAL EVERY 6 HOURS
Qty: 0 | Refills: 0 | Status: DISCONTINUED | OUTPATIENT
Start: 2018-10-02 | End: 2018-10-02

## 2018-10-02 RX ORDER — INFLUENZA VIRUS VACCINE 15; 15; 15; 15 UG/.5ML; UG/.5ML; UG/.5ML; UG/.5ML
0.5 SUSPENSION INTRAMUSCULAR ONCE
Qty: 0 | Refills: 0 | Status: DISCONTINUED | OUTPATIENT
Start: 2018-10-02 | End: 2018-10-02

## 2018-10-02 RX ORDER — DOCUSATE SODIUM 100 MG
1 CAPSULE ORAL
Qty: 0 | Refills: 0 | DISCHARGE
Start: 2018-10-02

## 2018-10-02 RX ORDER — TRAMADOL HYDROCHLORIDE 50 MG/1
1 TABLET ORAL
Qty: 20 | Refills: 0 | OUTPATIENT
Start: 2018-10-02 | End: 2018-10-06

## 2018-10-02 RX ORDER — ACETAMINOPHEN 500 MG
2 TABLET ORAL
Qty: 0 | Refills: 0 | DISCHARGE
Start: 2018-10-02

## 2018-10-02 RX ORDER — LEVETIRACETAM 250 MG/1
1 TABLET, FILM COATED ORAL
Qty: 14 | Refills: 0 | OUTPATIENT
Start: 2018-10-02 | End: 2018-10-08

## 2018-10-02 RX ORDER — ATORVASTATIN CALCIUM 80 MG/1
80 TABLET, FILM COATED ORAL AT BEDTIME
Qty: 0 | Refills: 0 | Status: DISCONTINUED | OUTPATIENT
Start: 2018-10-02 | End: 2018-10-02

## 2018-10-02 RX ORDER — DEXAMETHASONE 0.5 MG/5ML
1 ELIXIR ORAL
Qty: 20 | Refills: 0 | OUTPATIENT
Start: 2018-10-02 | End: 2018-10-11

## 2018-10-02 RX ORDER — LEVETIRACETAM 250 MG/1
1 TABLET, FILM COATED ORAL
Qty: 14 | Refills: 0
Start: 2018-10-02 | End: 2018-10-08

## 2018-10-02 RX ADMIN — RANOLAZINE 1000 MILLIGRAM(S): 500 TABLET, FILM COATED, EXTENDED RELEASE ORAL at 06:59

## 2018-10-02 RX ADMIN — LEVETIRACETAM 500 MILLIGRAM(S): 250 TABLET, FILM COATED ORAL at 06:59

## 2018-10-02 RX ADMIN — Medication 400 MILLIGRAM(S): at 08:45

## 2018-10-02 RX ADMIN — LISINOPRIL 2.5 MILLIGRAM(S): 2.5 TABLET ORAL at 06:59

## 2018-10-02 RX ADMIN — Medication 4 MILLIGRAM(S): at 06:59

## 2018-10-02 RX ADMIN — Medication 1000 MILLIGRAM(S): at 09:40

## 2018-10-02 RX ADMIN — Medication 100 MILLIGRAM(S): at 13:25

## 2018-10-02 RX ADMIN — Medication 10: at 12:48

## 2018-10-02 RX ADMIN — PANTOPRAZOLE SODIUM 40 MILLIGRAM(S): 20 TABLET, DELAYED RELEASE ORAL at 06:59

## 2018-10-02 RX ADMIN — Medication 4: at 09:38

## 2018-10-02 RX ADMIN — Medication 100 MILLIGRAM(S): at 06:59

## 2018-10-02 NOTE — DISCHARGE NOTE ADULT - PATIENT PORTAL LINK FT
You can access the [facility] â€“ [facility] Patient Portal, offered by [facility], by registering with the following website: [email or web address]

## 2018-10-02 NOTE — DISCHARGE NOTE ADULT - CARE PROVIDERS DIRECT ADDRESSES
[email or web address]

## 2018-10-02 NOTE — DISCHARGE NOTE ADULT - NS AS ACTIVITY OBS
No Heavy lifting/straining/Walking-Indoors allowed/Showering allowed/Walking-Outdoors allowed/Stairs allowed/Do not make important decisions/Do not drive or operate machinery

## 2018-10-02 NOTE — DISCHARGE NOTE ADULT - PLAN OF CARE
managed conservatively .  .  .  Please make an appointment for follow up with neurosurgeon Dr. Javier Slade within 7-10 days. Call [phone] to schedule an appointment. He will discuss with you the need for possible evacuation of subdural hematoma at follow up appointment.     NO heavy lifting, strenuous activity, twisting, bending, driving, or working until cleared by your physician.   Return to ER immediately for any of the following: worsening headaches, slurred speech, fever, bleeding, new onset numbness/tingling/weakness, nausea and/or vomiting, chest pain, shortness of breath, confusion, seizure, altered mental status, urinary and/or fecal incontinence or retention. .  .  Please make an appointment for follow up with your cardiologist after discharge.     **DO NOT RESTART Aspirin or Plavix until instructed to do so by Dr. Slade.** Please make an appointment for follow up with your primary care physician after discharge. Continue previous anti-hypertensive medications. Please make an appointment for follow up with your primary care physician after discharge. Continue Crestor. Please make an appointment for follow up with your primary care physician after discharge. Continue Janumet as previously taking. Your blood glucose may be elevated while you are on steroids. Please make an appointment for follow up with your primary care physician after discharge. Continue Timolol eye drops.

## 2018-10-02 NOTE — PROGRESS NOTE ADULT - ASSESSMENT
HPI: Patient is a 68 year old male with a past medical history of CAD s/p CABG in 2008 and stents 2014 (on ASA and plavix last taken 9/30/2018), HTN, HLD presents s/p mechanical fall 1 week prior with 4 day hx of L temporal HA. Pt slipped on wet surface after getting out of shower, +head trauma, no LOC, no pain other than constant L parietal HA that started after fall somewhat alleviated by tylenol. Pt denies preceding dizziness or CP. Patient was seen by PMD today who was concerned that he was unsteady on his feet so he was sent into the hospital. (01 Oct 2018 14:10)      PLAN:   -Repeat CT head this morning  -Continue decadron 4mg every 12 hours for brain compression from subdural hematoma  -Continue Keppra 500 q12h for seizure prophylaxis  -Continue Tramadol 50q6 PRN for pain control   -Continue to hold aspirin and plavix in setting of acute SDH  -Continue Lisinopril 2.5 QD, Metoprolol XL 25 daily, and Ranolazine 1g BID for hx of HTN; increased Lipitor to 80mg qHS  -Continue HISS for hx of T2DM; anticipate hyperglycemia due to steroids  -Encouraged mobilization with assistance  -Incentive spirometer  -DVT prophylaxis: bilateral venodynes; off chemoprophlyaxis due to acute SDH  -Dispo: anticipate discharge home today with outpatient follow up if repeat CT stable  -Discussed with Dr. Berlin Lopes #[id number]

## 2018-10-02 NOTE — DISCHARGE NOTE ADULT - REASON FOR ADMISSION
Admitted 10/2 with worsening headaches, found with left acute subdural hematoma Admitted 10/2 with worsening headaches, found to have a left acute subdural hematoma

## 2018-10-02 NOTE — DISCHARGE NOTE ADULT - MEDICATION SUMMARY - MEDICATIONS TO STOP TAKING
I will STOP taking the medications listed below when I get home from the hospital:    clopidogrel 75 mg oral tablet  -- 1 tab(s) by mouth once a day    Ecotrin Adult Low Strength 81 mg oral delayed release tablet  -- 1 tab(s) by mouth once a day

## 2018-10-02 NOTE — DISCHARGE NOTE ADULT - SECONDARY DIAGNOSIS.
CAD (coronary artery disease) HTN (hypertension) HLD (hyperlipidemia) Type 2 diabetes mellitus without complication, without long-term current use of insulin Glaucoma

## 2018-10-02 NOTE — PROGRESS NOTE ADULT - SUBJECTIVE AND OBJECTIVE BOX
SUBJECTIVE: Patient seen and examined with wife and daughter at bedside. Complaining of headaches, improved with pain medication. Denies nausea, vomiting, vision changes, chest pain, shortness of breath.     Vital Signs Last 24 Hrs  T(C): 37 (10-02-18 @ 08:26), Max: 37 (10-02-18 @ 08:26)  T(F): 98.6 (10-02-18 @ 08:26), Max: 98.6 (10-02-18 @ 08:26)  HR: 66 (10-02-18 @ 08:26) (61 - 81)  BP: 135/62 (10-02-18 @ 08:26) (84/52 - 159/71)  RR: 18 (10-02-18 @ 08:26) (16 - 20)  SpO2: 96% (10-02-18 @ 08:26) (96% - 100%)    PHYSICAL EXAM:    Constitutional: No Acute Distress, resting comfortably in bed    Neurological: Awake, alert, oriented to person, place and time, speech clear and fluent, decreased nasolabial fold on right, tongue midline, briskly following commands, mild RUE drift, moves all extremities: RUE 4+/5, LUE 5/5, RLE 4+/5, LLE 5/5, sensation intact to light touch throughout, Rt pupils 4mm and reactive, left pupil surgical 3mm and nonreactive , extraocular movements intact, no nystagmus    Pulmonary: Clear to Auscultation, No rales, No rhonchi, No wheezes     Cardiovascular: S1, S2, Regular rate and rhythm     Gastrointestinal: Soft, Non-tender, Non-distended, +bowel sounds x 4    Extremities: No calf tenderness bilaterally, no cyanosis, clubbing or edema      LABS:                          10.9   6.7   )-----------( 170      ( 01 Oct 2018 13:27 )             32.5    10-01    137  |  102  |  12  ----------------------------<  137<H>  4.1   |  23  |  1.05    Ca    9.2      01 Oct 2018 13:27    TPro  7.3  /  Alb  3.7  /  TBili  0.4  /  DBili  x   /  AST  32  /  ALT  32  /  AlkPhos  48  10-01  PT/INR - ( 01 Oct 2018 13:27 )   PT: 12.4 sec;   INR: 1.14 ratio         PTT - ( 01 Oct 2018 13:27 )  PTT:34.1 sec      IMAGING:     < from: CT Head No Cont (10.01.18 @ 16:12) >  IMPRESSION:    Stable left frontal parietal temporal subdural hematoma.    < end of copied text >      < from: CT Head No Cont (10.01.18 @ 12:46) >  Impression:    Acute on chronic left frontal parietal and temporal subdural hematoma   measuring up to 1.9 cm in greatest thickness and resulting in rightward   shift of 3 mm.    < end of copied text >        MEDICATIONS:  Antibiotics:    Neuro:  acetaminophen   Tablet .. 650 milliGRAM(s) Oral every 6 hours PRN Temp greater or equal to 38C (100.4F), Mild Pain (1 - 3)  levETIRAcetam 500 milliGRAM(s) Oral every 12 hours  ondansetron   Disintegrating Tablet 4 milliGRAM(s) Oral every 6 hours PRN Nausea  traMADol 50 milliGRAM(s) Oral every 6 hours PRN Moderate Pain (4 - 6)    Cardiac:  lisinopril 2.5 milliGRAM(s) Oral daily  metoprolol succinate ER 25 milliGRAM(s) Oral daily  ranolazine 1000 milliGRAM(s) Oral two times a day  tamsulosin 0.4 milliGRAM(s) Oral at bedtime    Pulm:    GI/:  docusate sodium 100 milliGRAM(s) Oral three times a day  pantoprazole    Tablet 40 milliGRAM(s) Oral before breakfast  senna 2 Tablet(s) Oral at bedtime PRN Constipation    Other:   atorvastatin 80 milliGRAM(s) Oral at bedtime  dexamethasone     Tablet 4 milliGRAM(s) Oral every 12 hours  dextrose 40% Gel 15 Gram(s) Oral once PRN Blood Glucose LESS THAN 70 milliGRAM(s)/deciliter  dextrose 5%. 1000 milliLiter(s) IV Continuous <Continuous>  dextrose 50% Injectable 12.5 Gram(s) IV Push once  dextrose 50% Injectable 25 Gram(s) IV Push once  dextrose 50% Injectable 25 Gram(s) IV Push once  glucagon  Injectable 1 milliGRAM(s) IntraMuscular once PRN Glucose LESS THAN 70 milligrams/deciliter  influenza   Vaccine 0.5 milliLiter(s) IntraMuscular once  insulin lispro (HumaLOG) corrective regimen sliding scale   SubCutaneous three times a day before meals  insulin lispro (HumaLOG) corrective regimen sliding scale   SubCutaneous at bedtime  sodium chloride 0.9% with potassium chloride 20 mEq/L 1000 milliLiter(s) IV Continuous <Continuous>  timolol 0.5% Solution 1 Drop(s) Both EYES daily        DIET: CCD halal

## 2018-10-02 NOTE — DISCHARGE NOTE ADULT - CARE PROVIDER_API CALL
Javier Slade (MD), Neurological Surgery  [address]  [address] [place] [address]  Phone: [phone] (Clinical)  Fax: [phone]    Selena Walters (MD), Internal Medicine  [address]  [address] [place] [address]  Phone: [phone]  Fax: [phone]

## 2018-10-02 NOTE — DISCHARGE NOTE ADULT - ADDITIONAL INSTRUCTIONS
Please make an appointment for follow up with your primary care physician after discharge.   Please make an appointment for follow up with neurosurgeon Dr. Javier Euceda within 7-10 days. Call [phone] to schedule an appointment.   **DO NOT RESTART ASPIRIN OR PLAVIX UNTIL INSTRUCTED TO DO SO BY DR. EUCEDA**

## 2018-10-02 NOTE — DISCHARGE NOTE ADULT - HOSPITAL COURSE
Patient is a 68 year old male with a past medical history of CAD s/p CABG in 2008 and stents 2014 (on ASA and plavix last taken 9/30/2018), HTN, HLD presents s/p mechanical fall 1 week prior to admission with a 4 day history of left temporal HA. CT head was performed and revealed acute 1.8cm left frontoparietal subdural hematoma with midline shift. Patient had 2 repeat CT heads which revealed stable subdural hematoma. He was started on Decadron 4mg every 12 hours and Keppra 500 q12h for seizure prophylaxis. He remained neurologically stable. Given recent anti-coagulation use and stable neurologic exam, patient instructed to follow up as outpatient for re-evaluation. On the day of discharge, he is medically and neurosurgically stable and cleared for discharge home with clear instructions to follow up with Dr. Slade as outpatient in 7-10 days to evaluate for possible evacuation at a later date.     More than 30 minutes were spent educating the patient and family regarding condition, medications, follow up plans, signs and symptoms to be concerned with, preparing paperwork, and questions answered regarding discharge. Patient is a 68 year old male with a past medical history of CAD s/p CABG in 2008 and stents 2014 (on ASA and plavix last taken 9/30/2018), HTN, HLD presents s/p mechanical fall 1 week prior to admission with a 4 day history of left temporal HA. CT head was performed and revealed an acute 1.8cm left frontoparietal subdural hematoma with midline shift. Patient was seen in the ED by Dr. Slade and had 2 repeat CT heads which revealed stable subdural hematoma. He was started on Decadron 4mg every 12 hours and Keppra 500 q12h for seizure prophylaxis. He remained neurologically stable. Given recent anti-coagulation use and stable neurologic exam, patient was instructed to follow up as outpatient for re-evaluation. On the day of discharge, he was medically and neurosurgically stable and cleared for discharge home with clear instructions to follow up with Dr. Slade as outpatient in 7-10 days. He will be evaluated for possible surgical evacuation at a later date.     More than 30 minutes were spent educating the patient and family regarding condition, medications, follow up plans, signs and symptoms to be concerned with, preparing paperwork, and questions answered regarding discharge.

## 2018-10-02 NOTE — DISCHARGE NOTE ADULT - MEDICATION SUMMARY - MEDICATIONS TO TAKE
I will START or STAY ON the medications listed below when I get home from the hospital:    dexamethasone 4 mg oral tablet  -- 1 tab(s) by mouth every 12 hours  -- Indication: For Subdural hematoma    acetaminophen 325 mg oral tablet  -- 2 tab(s) by mouth every 6 hours, As needed, Temp greater or equal to 38C (100.4F), Mild Pain (1 - 3)  -- Indication: For Mild pain    traMADol 50 mg oral tablet  -- 1 tab(s) by mouth every 6 hours, As needed, Moderate Pain (4 - 6) MDD:4 tabs  -- Indication: For Moderate pain    lisinopril 2.5 mg oral tablet  -- 1 tab(s) by mouth once a day  -- Indication: For Hypertension    tamsulosin 0.4 mg oral capsule  -- 1 cap(s) by mouth once a day  -- Indication: For BPH    Ranexa 1000 mg oral tablet, extended release  -- 1 tab(s) by mouth 2 times a day  -- Indication: For Angina    levETIRAcetam 500 mg oral tablet  -- 1 tab(s) by mouth every 12 hours  -- Indication: For Seizure prophylaxis    dapagliflozin 5 mg oral tablet  -- 1 tab(s) by mouth once a day    -- Indication: For Type 2 Diabetes Mellitus    Janumet 1000 mg-50 mg oral tablet  -- 1 tab(s) by mouth 2 times a day may resume 11/19/16    -- Indication: For Type 2 Diabetes Mellitus    Crestor 20 mg oral tablet  -- 1 tab(s) by mouth once a day (at bedtime)  -- Indication: For Hyperlipidemia    metoprolol succinate 25 mg oral tablet, extended release  -- 0.5 tab(s) by mouth once a day  -- Indication: For Hypertension     senna oral tablet  -- 2 tab(s) by mouth once a day (at bedtime), As needed, Constipation  -- Indication: For Constipation    docusate sodium 100 mg oral capsule  -- 1 cap(s) by mouth 3 times a day  -- Indication: For Constipation    timolol hemihydrate 0.5% ophthalmic solution  -- 1 drop(s) to each affected eye 2 times a day  -- Indication: For Glaucoma    omeprazole 20 mg oral delayed release capsule  -- 1 cap(s) by mouth once a day  -- Indication: For GERD    multivitamin with minerals  -- 1 tab(s) by mouth once a day  -- Indication: For Supplemental

## 2018-10-11 ENCOUNTER — OUTPATIENT (OUTPATIENT)
Dept: OUTPATIENT SERVICES | Facility: HOSPITAL | Age: 68
LOS: 1 days | End: 2018-10-11
Payer: COMMERCIAL

## 2018-10-11 ENCOUNTER — APPOINTMENT (OUTPATIENT)
Dept: NEUROSURGERY | Facility: CLINIC | Age: 68
End: 2018-10-11
Payer: MEDICARE

## 2018-10-11 VITALS
WEIGHT: 145 LBS | RESPIRATION RATE: 16 BRPM | HEART RATE: 79 BPM | SYSTOLIC BLOOD PRESSURE: 80 MMHG | DIASTOLIC BLOOD PRESSURE: 45 MMHG | TEMPERATURE: 97.6 F | OXYGEN SATURATION: 98 % | BODY MASS INDEX: 24.16 KG/M2 | HEIGHT: 65 IN

## 2018-10-11 VITALS — SYSTOLIC BLOOD PRESSURE: 103 MMHG | DIASTOLIC BLOOD PRESSURE: 65 MMHG

## 2018-10-11 DIAGNOSIS — I25.10 ATHEROSCLEROTIC HEART DISEASE OF NATIVE CORONARY ARTERY WITHOUT ANGINA PECTORIS: Chronic | ICD-10-CM

## 2018-10-11 DIAGNOSIS — Z98.49 CATARACT EXTRACTION STATUS, UNSPECIFIED EYE: Chronic | ICD-10-CM

## 2018-10-11 DIAGNOSIS — Z71.9 COUNSELING, UNSPECIFIED: ICD-10-CM

## 2018-10-11 DIAGNOSIS — H26.9 UNSPECIFIED CATARACT: Chronic | ICD-10-CM

## 2018-10-11 DIAGNOSIS — Z95.1 PRESENCE OF AORTOCORONARY BYPASS GRAFT: Chronic | ICD-10-CM

## 2018-10-11 DIAGNOSIS — H40.9 UNSPECIFIED GLAUCOMA: Chronic | ICD-10-CM

## 2018-10-11 DIAGNOSIS — S06.5X9A TRAUMATIC SUBDURAL HEMORRHAGE WITH LOSS OF CONSCIOUSNESS OF UNSPECIFIED DURATION, INITIAL ENCOUNTER: ICD-10-CM

## 2018-10-11 PROCEDURE — 99215 OFFICE O/P EST HI 40 MIN: CPT

## 2018-10-11 PROCEDURE — 70450 CT HEAD/BRAIN W/O DYE: CPT | Mod: 26

## 2018-10-11 PROCEDURE — 70450 CT HEAD/BRAIN W/O DYE: CPT

## 2018-10-19 ENCOUNTER — APPOINTMENT (OUTPATIENT)
Dept: NEUROLOGY | Facility: CLINIC | Age: 68
End: 2018-10-19
Payer: MEDICARE

## 2018-10-19 ENCOUNTER — APPOINTMENT (OUTPATIENT)
Dept: NEUROLOGY | Facility: CLINIC | Age: 68
End: 2018-10-19

## 2018-10-19 VITALS
BODY MASS INDEX: 22.76 KG/M2 | DIASTOLIC BLOOD PRESSURE: 60 MMHG | WEIGHT: 145 LBS | SYSTOLIC BLOOD PRESSURE: 88 MMHG | HEIGHT: 67 IN | HEART RATE: 80 BPM

## 2018-10-19 PROCEDURE — 99205 OFFICE O/P NEW HI 60 MIN: CPT

## 2018-10-19 RX ORDER — CLOPIDOGREL BISULFATE 75 MG/1
75 TABLET, FILM COATED ORAL
Qty: 90 | Refills: 0 | Status: DISCONTINUED | COMMUNITY
Start: 2017-07-14 | End: 2018-10-01

## 2018-10-22 ENCOUNTER — APPOINTMENT (OUTPATIENT)
Dept: NEUROLOGY | Facility: CLINIC | Age: 68
End: 2018-10-22
Payer: MEDICARE

## 2018-10-22 PROCEDURE — 95819 EEG AWAKE AND ASLEEP: CPT

## 2018-10-23 PROCEDURE — 95953: CPT

## 2018-10-24 ENCOUNTER — OUTPATIENT (OUTPATIENT)
Dept: OUTPATIENT SERVICES | Facility: HOSPITAL | Age: 68
LOS: 1 days | End: 2018-10-24
Payer: COMMERCIAL

## 2018-10-24 DIAGNOSIS — H26.9 UNSPECIFIED CATARACT: Chronic | ICD-10-CM

## 2018-10-24 DIAGNOSIS — Z98.49 CATARACT EXTRACTION STATUS, UNSPECIFIED EYE: Chronic | ICD-10-CM

## 2018-10-24 DIAGNOSIS — I25.10 ATHEROSCLEROTIC HEART DISEASE OF NATIVE CORONARY ARTERY WITHOUT ANGINA PECTORIS: Chronic | ICD-10-CM

## 2018-10-24 DIAGNOSIS — S06.5X9A TRAUMATIC SUBDURAL HEMORRHAGE WITH LOSS OF CONSCIOUSNESS OF UNSPECIFIED DURATION, INITIAL ENCOUNTER: ICD-10-CM

## 2018-10-24 DIAGNOSIS — Z95.1 PRESENCE OF AORTOCORONARY BYPASS GRAFT: Chronic | ICD-10-CM

## 2018-10-24 DIAGNOSIS — H40.9 UNSPECIFIED GLAUCOMA: Chronic | ICD-10-CM

## 2018-10-24 PROCEDURE — 70450 CT HEAD/BRAIN W/O DYE: CPT | Mod: 26

## 2018-10-24 PROCEDURE — 70450 CT HEAD/BRAIN W/O DYE: CPT

## 2018-11-02 ENCOUNTER — APPOINTMENT (OUTPATIENT)
Dept: NEUROSURGERY | Facility: CLINIC | Age: 68
End: 2018-11-02
Payer: MEDICARE

## 2018-11-02 VITALS
WEIGHT: 124 LBS | HEIGHT: 67 IN | BODY MASS INDEX: 19.46 KG/M2 | OXYGEN SATURATION: 98 % | TEMPERATURE: 98.7 F | SYSTOLIC BLOOD PRESSURE: 113 MMHG | RESPIRATION RATE: 16 BRPM | HEART RATE: 98 BPM | DIASTOLIC BLOOD PRESSURE: 67 MMHG

## 2018-11-02 PROCEDURE — 99214 OFFICE O/P EST MOD 30 MIN: CPT

## 2018-11-11 PROCEDURE — 85027 COMPLETE CBC AUTOMATED: CPT

## 2018-11-11 PROCEDURE — 86901 BLOOD TYPING SEROLOGIC RH(D): CPT

## 2018-11-11 PROCEDURE — 85610 PROTHROMBIN TIME: CPT

## 2018-11-11 PROCEDURE — 86850 RBC ANTIBODY SCREEN: CPT

## 2018-11-11 PROCEDURE — 86900 BLOOD TYPING SEROLOGIC ABO: CPT

## 2018-11-11 PROCEDURE — 99285 EMERGENCY DEPT VISIT HI MDM: CPT | Mod: 25

## 2018-11-11 PROCEDURE — 96374 THER/PROPH/DIAG INJ IV PUSH: CPT

## 2018-11-11 PROCEDURE — 83036 HEMOGLOBIN GLYCOSYLATED A1C: CPT

## 2018-11-11 PROCEDURE — 85730 THROMBOPLASTIN TIME PARTIAL: CPT

## 2018-11-11 PROCEDURE — 70450 CT HEAD/BRAIN W/O DYE: CPT

## 2018-11-11 PROCEDURE — 82962 GLUCOSE BLOOD TEST: CPT

## 2018-11-11 PROCEDURE — 80053 COMPREHEN METABOLIC PANEL: CPT

## 2018-11-16 ENCOUNTER — APPOINTMENT (OUTPATIENT)
Dept: NEUROLOGY | Facility: CLINIC | Age: 68
End: 2018-11-16
Payer: MEDICARE

## 2018-11-16 VITALS
HEART RATE: 97 BPM | BODY MASS INDEX: 21.5 KG/M2 | SYSTOLIC BLOOD PRESSURE: 103 MMHG | HEIGHT: 67 IN | DIASTOLIC BLOOD PRESSURE: 64 MMHG | WEIGHT: 137 LBS

## 2018-11-16 PROCEDURE — 99214 OFFICE O/P EST MOD 30 MIN: CPT

## 2018-12-13 ENCOUNTER — OUTPATIENT (OUTPATIENT)
Dept: OUTPATIENT SERVICES | Facility: HOSPITAL | Age: 68
LOS: 1 days | End: 2018-12-13
Payer: COMMERCIAL

## 2018-12-13 DIAGNOSIS — Z95.1 PRESENCE OF AORTOCORONARY BYPASS GRAFT: Chronic | ICD-10-CM

## 2018-12-13 DIAGNOSIS — S06.5X9A TRAUMATIC SUBDURAL HEMORRHAGE WITH LOSS OF CONSCIOUSNESS OF UNSPECIFIED DURATION, INITIAL ENCOUNTER: ICD-10-CM

## 2018-12-13 DIAGNOSIS — I25.10 ATHEROSCLEROTIC HEART DISEASE OF NATIVE CORONARY ARTERY WITHOUT ANGINA PECTORIS: Chronic | ICD-10-CM

## 2018-12-13 DIAGNOSIS — H40.9 UNSPECIFIED GLAUCOMA: Chronic | ICD-10-CM

## 2018-12-13 DIAGNOSIS — Z98.49 CATARACT EXTRACTION STATUS, UNSPECIFIED EYE: Chronic | ICD-10-CM

## 2018-12-13 DIAGNOSIS — H26.9 UNSPECIFIED CATARACT: Chronic | ICD-10-CM

## 2018-12-13 PROCEDURE — 70450 CT HEAD/BRAIN W/O DYE: CPT

## 2018-12-13 PROCEDURE — 70450 CT HEAD/BRAIN W/O DYE: CPT | Mod: 26

## 2018-12-18 ENCOUNTER — APPOINTMENT (OUTPATIENT)
Dept: NEUROSURGERY | Facility: CLINIC | Age: 68
End: 2018-12-18
Payer: MEDICARE

## 2018-12-18 PROCEDURE — 99214 OFFICE O/P EST MOD 30 MIN: CPT

## 2018-12-18 RX ORDER — LEVETIRACETAM 500 MG/1
500 TABLET, FILM COATED ORAL
Refills: 0 | Status: DISCONTINUED | COMMUNITY
End: 2018-12-18

## 2019-01-18 ENCOUNTER — APPOINTMENT (OUTPATIENT)
Dept: NEUROLOGY | Facility: CLINIC | Age: 69
End: 2019-01-18
Payer: MEDICARE

## 2019-01-18 VITALS
DIASTOLIC BLOOD PRESSURE: 68 MMHG | SYSTOLIC BLOOD PRESSURE: 103 MMHG | BODY MASS INDEX: 21.97 KG/M2 | HEIGHT: 67 IN | WEIGHT: 140 LBS | HEART RATE: 83 BPM

## 2019-01-18 PROCEDURE — 99214 OFFICE O/P EST MOD 30 MIN: CPT

## 2019-01-18 NOTE — HISTORY OF PRESENT ILLNESS
[FreeTextEntry1] : *** 01/18/2019 ***\par Mr. CULP reports that lacosamide is interfering with his sleeping and he reduced his dose to 50 mg daily. No interval seizures. Mr. CULP is eager to resume driving. \par \par *** 11/16/2018 ***\par Mr. CULP has not had any interval seizures per family (he is unaware).  He is off Keppra, and family feels he is less irritable.  He is taking Vimpat, but felt unsteady and nauseated when dose was increased to 100mg twice a day, he reduced to 50 twice a day and feels well at that dose. He has been taking Vimpat alone since approx 10/23/18. His weight had dropped from 145 -> 133, now back up to 137, and he feels that his appetite is better. CT scan from 11/2 shows interval change in SDH, with contraction of hemorrhagic component but change to proteinaceous fluid and remaining mass effect. \par \par *** 10/19/2018 ***\par Mr. CULP is a 68 man who presented to ED on 10/1/18 and was found to have left convexity SDH likely resulting from a fall several weeks earlier.  Mr. CULP has been taking aspirin and Plavix (both now DC'd) for cardiac stent. he as started on Decadron for the subdural and on levetiracetam for seizure prophylaxis. On Oct 9, Mr. CULP's daughter, Dr. Nehemiah Martin, noted him to be disoriented and slurring his speech.  On Dr. Slade's recommendation, Mr. CULP took an extra dose of levetiracetam. The following morning he was back to baseline.  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 He had recurrent focal seizure again at home. He had a third witnessed event at night, when his wife noted that he awoke with a vocalization, experienced brief R facial twitching and unrespnsoiveness. He is taking levetiracetam 500 q12.  \par \par Mr. CULP minimized his symptoms and instead endorsed that his problem was aggravation he was getting from outside causes.  Mr. CULP's family endorsed that he is significantly more irritable since starting Keppra. \par \par *** from Dr. Slade's note 10/11/18 ***

## 2019-01-18 NOTE — ASSESSMENT
[FreeTextEntry1] : Mr. CULP is a 68 y M referred by Dr. Slade for focal seizure that developed in setting of L hemisphere subdural. He has had a number of focal seizures on Keppra 500 q12 and family was reporting increased irritability. He switched to Vimpat and has not had recurrent seizure, thought dose is low (did not tolerate 100 q12, now taking 50 qD).  \par \par 1. start lamotrigine and titrate weekly to 75 bid, continue Vimpat 50 qD for now.\par 2. RTC 2 mo\par 3. Mr. CULP was counselled not to drive, we will reassess at future visits. We reviewed [place] rules regarding driving. \par 4. Mr. CULP was counselled on risk of rash and other side effects with lamotrigine. \par \par Greater than 50% of the encounter time was spent on counseling and coordination of care for reviewing records in [employer], discussion with patient regarding plan. I have spent 25 minutes of face to face time with the patient reviewing the cause of seizures or seizure-like events, assessing the risk of recurrence, educating the patient or family to recognize seizures, and discussing possible treatment options and possible side effects of seizure medications. I also discussed seizure safety, and ways of reducing seizure risk.\par \par

## 2019-02-01 ENCOUNTER — MEDICATION RENEWAL (OUTPATIENT)
Age: 69
End: 2019-02-01

## 2019-02-01 RX ORDER — LAMOTRIGINE 25 MG/1
25 TABLET ORAL
Qty: 180 | Refills: 1 | Status: DISCONTINUED | COMMUNITY
Start: 2019-01-18 | End: 2019-02-01

## 2019-03-12 ENCOUNTER — APPOINTMENT (OUTPATIENT)
Dept: NEUROSURGERY | Facility: CLINIC | Age: 69
End: 2019-03-12
Payer: MEDICARE

## 2019-03-12 VITALS
RESPIRATION RATE: 16 BRPM | DIASTOLIC BLOOD PRESSURE: 57 MMHG | HEART RATE: 85 BPM | SYSTOLIC BLOOD PRESSURE: 89 MMHG | WEIGHT: 140 LBS | TEMPERATURE: 97.5 F | HEIGHT: 67 IN | BODY MASS INDEX: 21.97 KG/M2 | OXYGEN SATURATION: 96 %

## 2019-03-12 PROCEDURE — 99214 OFFICE O/P EST MOD 30 MIN: CPT

## 2019-03-12 RX ORDER — ASPIRIN 81 MG
81 TABLET, DELAYED RELEASE (ENTERIC COATED) ORAL
Refills: 0 | Status: ACTIVE | COMMUNITY

## 2019-03-12 NOTE — DATA REVIEWED
[de-identified] : EXAM: CT BRAIN    PROCEDURE DATE: 12/13/2018      INTERPRETATION: EXAM: CT HEAD WITHOUT INTRAVENOUS CONTRAST   CLINICAL INFORMATION: Subdural hematoma, follow-up.   TECHNIQUE: Noncontrast axial CT images were acquired through the head.  Two-dimensional sagittal and coronal reformats were generated.   COMPARISON STUDY: CT head dated 10/24/2018.   FINDINGS:  There is a hypodense 5 mm chronic left frontoparietal subdural hematoma,  significantly decreased in size compared to prior. Previously noted mass  effect and rightward midline shift, now resolved.   There is no evidence of acute intracranial hemorrhage. The basal cisterns  are patent. No hydrocephalus.   There is age-appropriate cerebral volume loss. There is mild to moderate  white matter hypoattenuation which is nonspecific in etiology but likely  related to chronic microvascular disease.   Mild mucosal thickening of the left axillary sinus. The other visualized  paranasal sinuses are clear. The mastoid air cells and middle ear cavities  are grossly clear. Bilateral cataract surgery.   There is a new minimally displaced fracture of the right zygomatic arch.   IMPRESSION:  Chronic left frontoparietal subdural hematoma measuring 5 mm in depth,  significantly decreased compared to prior. Resolved rightward midline shift  and mass effect.   New minimally displaced right-sided zygomatic arch fracture without  associated soft tissue swelling. Correlate clinically for timeframe.         JUN NJ M.D., RADIOLOGY RESIDENT  This document has been electronically signed.  BETTE LARA M.D., ATTENDING RADIOLOGIST  This document has been electronically signed. Dec 13 2018 4:01PM

## 2019-03-12 NOTE — PHYSICAL EXAM
[General Appearance - Alert] : alert [General Appearance - In No Acute Distress] : in no acute distress [General Appearance - Well Nourished] : well nourished [General Appearance - Well Developed] : well developed [General Appearance - Well-Appearing] : healthy appearing [Oriented To Time, Place, And Person] : oriented to person, place, and time [Impaired Insight] : insight and judgment were intact [Affect] : the affect was normal [Memory Recent] : recent memory was not impaired [Person] : oriented to person [Place] : oriented to place [Time] : oriented to time [Cranial Nerves Optic (II)] : visual acuity intact bilaterally,  pupils equal round and reactive to light [Cranial Nerves Oculomotor (III)] : extraocular motion intact [Cranial Nerves Trigeminal (V)] : facial sensation intact symmetrically [Cranial Nerves Facial (VII)] : face symmetrical [Cranial Nerves Vestibulocochlear (VIII)] : hearing was intact bilaterally [Cranial Nerves Glossopharyngeal (IX)] : tongue and palate midline [Cranial Nerves Accessory (XI - Cranial And Spinal)] : head turning and shoulder shrug symmetric [Cranial Nerves Hypoglossal (XII)] : there was no tongue deviation with protrusion [Motor Strength] : muscle strength was normal in all four extremities [Motor Handedness Right-Handed] : the patient is right hand dominant [Sclera] : the sclera and conjunctiva were normal [PERRL With Normal Accommodation] : pupils were equal in size, round, reactive to light, with normal accommodation [Extraocular Movements] : extraocular movements were intact [Outer Ear] : the ears and nose were normal in appearance [Hearing Threshold Finger Rub Not [place]] : hearing was normal [Oropharynx] : the oropharynx was normal [Respiration, Rhythm And Depth] : normal respiratory rhythm and effort [Exaggerated Use Of Accessory Muscles For Inspiration] : no accessory muscle use [Heart Rate And Rhythm] : heart rate was normal and rhythm regular [Involuntary Movements] : no involuntary movements were seen [Motor Tone] : muscle strength and tone were normal [Skin Color & Pigmentation] : normal skin color and pigmentation [] : no rash [Abnormal Walk] : normal gait [Balance] : balance was intact [2+] : Ankle jerk left 2+ [Sensation Tactile Decrease] : light touch was intact [Sensation Pain / Temperature Decrease] : pain and temperature was intact [FreeTextEntry5] : No drift both upper extremities [FreeTextEntry7] : Normal sensation all extremities

## 2019-03-12 NOTE — HISTORY OF PRESENT ILLNESS
[FreeTextEntry1] : Sarah Thomas is a pleasant 69 yr old right handed gentleman who is here today 5 months after he presented to [facility] 10/1/2018 after falling a couple of weeks prior to that (at the time he was on ASA and Plavix for cardiac stents). He presented with  left convexity SDH likely resulting from the fall several weeks earlier. \par \par Today, he denies headaches and is no longer taking pain medicine. He feels as if his memory, strength and gait have improved.  He denies motor weakness and reports improved balance. He is being follow by Neurologist Dr. Obinna Harrison for the seizure medications. \par \par His PCP is Dr. Abigail Schmid, [facility], [phone].  His Cardiologist is Dr. Skylar Arzate, [facility], Interventional Cardiologist is Dr. Mynor Leong, [facility]. His ophthalmologist is Dr. Larueano Hester, [facility].

## 2019-03-12 NOTE — ASSESSMENT
[FreeTextEntry1] : IMPRESSION:\par 1. Doing very well s/p left acute subdural hematoma 5 months ago - managed conservatively\par 2. No current symptoms or deficits\par 3. No seizures  since October 2018\par \par PLAN:\par 1. CT brain non contrast.\par 2. F/U with Dr. Obinna Harrison.\par \par Javier Slade MD, FACS, FAANS\par Professor and \par Department of Neurosurgery\par [facility] [employer] [facility]\par [address]\[place] [address]\par [phone] Clinical\par [phone] Academic\par

## 2019-03-21 ENCOUNTER — OUTPATIENT (OUTPATIENT)
Dept: OUTPATIENT SERVICES | Facility: HOSPITAL | Age: 69
LOS: 1 days | End: 2019-03-21
Payer: COMMERCIAL

## 2019-03-21 DIAGNOSIS — Z95.1 PRESENCE OF AORTOCORONARY BYPASS GRAFT: Chronic | ICD-10-CM

## 2019-03-21 DIAGNOSIS — S06.5X9A TRAUMATIC SUBDURAL HEMORRHAGE WITH LOSS OF CONSCIOUSNESS OF UNSPECIFIED DURATION, INITIAL ENCOUNTER: ICD-10-CM

## 2019-03-21 DIAGNOSIS — I25.10 ATHEROSCLEROTIC HEART DISEASE OF NATIVE CORONARY ARTERY WITHOUT ANGINA PECTORIS: Chronic | ICD-10-CM

## 2019-03-21 DIAGNOSIS — H40.9 UNSPECIFIED GLAUCOMA: Chronic | ICD-10-CM

## 2019-03-21 DIAGNOSIS — Z98.49 CATARACT EXTRACTION STATUS, UNSPECIFIED EYE: Chronic | ICD-10-CM

## 2019-03-21 DIAGNOSIS — H26.9 UNSPECIFIED CATARACT: Chronic | ICD-10-CM

## 2019-03-21 PROCEDURE — 70450 CT HEAD/BRAIN W/O DYE: CPT | Mod: 26

## 2019-03-21 PROCEDURE — 70450 CT HEAD/BRAIN W/O DYE: CPT

## 2019-03-29 ENCOUNTER — APPOINTMENT (OUTPATIENT)
Dept: NEUROLOGY | Facility: CLINIC | Age: 69
End: 2019-03-29
Payer: MEDICARE

## 2019-03-29 VITALS
HEIGHT: 67 IN | BODY MASS INDEX: 21.97 KG/M2 | DIASTOLIC BLOOD PRESSURE: 59 MMHG | SYSTOLIC BLOOD PRESSURE: 96 MMHG | HEART RATE: 85 BPM | WEIGHT: 140 LBS

## 2019-03-29 PROCEDURE — 99213 OFFICE O/P EST LOW 20 MIN: CPT

## 2019-03-30 NOTE — ASSESSMENT
[FreeTextEntry1] : Mr. CULP is a 69 y M referred by Dr. Slade for focal seizure that developed in setting of L hemisphere subdural. He had a number of focal seizures on Keppra 500 q12 and family was reporting increased irritability. He switched to Vimpat and has not had recurrent seizure, though did not tolerate 100 q12, and was taking 50 qD. lacosamide half-life is 13-16 hours. In January visit, we tried switch to lamotrigine, which was not tolerated, and then settled on giving lacosamide 50 twice a day. \par \par 1. continue lacosamide 50 q12\par 2. check lacosamide level\par 3. RTC 4 mo\par \par \par \par

## 2019-03-30 NOTE — HISTORY OF PRESENT ILLNESS
[FreeTextEntry1] : *** 01/18/2019 ***\par Mr. CULP reports that lacosamide is interfering with his sleeping and he reduced his dose to 50 mg daily. No interval seizures. Mr. CULP is eager to resume driving. \par \par *** 03/30/2019 ***\par Mr. MICHEL CULP returns for scheduled follow-up appointment. Mr. CULP reports that in the interval since his last visit, he is doing well. No interval seizures. He is tolerating lacosamide at a dose of 50mg q12. Denies fatigue or other side effects. 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 \par \par *** 11/16/2018 ***\par Mr. CULP has not had any interval seizures per family (he is unaware).  He is off Keppra, and family feels he is less irritable.  He is taking Vimpat, but felt unsteady and nauseated when dose was increased to 100mg twice a day, he reduced to 50 twice a day and feels well at that dose. He has been taking Vimpat alone since approx 10/23/18. His weight had dropped from 145 -> 133, now back up to 137, and he feels that his appetite is better. CT scan from 11/2 shows interval change in SDH, with contraction of hemorrhagic component but change to proteinaceous fluid and remaining mass effect. \par \par *** 10/19/2018 ***\par Mr. CULP is a 68 man who presented to ED on 10/1/18 and was found to have left convexity SDH likely resulting from a fall several weeks earlier.  Mr. CULP has been taking aspirin and Plavix (both now DC'd) for cardiac stent. he as started on Decadron for the subdural and on levetiracetam for seizure prophylaxis. On Oct 9, Mr. CULP's daughter, Dr. Nehemiah Martin, noted him to be disoriented and slurring his speech.  On Dr. Slade's recommendation, Mr. CULP took an extra dose of levetiracetam. The following morning he was back to baseline.  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 He had recurrent focal seizure again at home. He had a third witnessed event at night, when his wife noted that he awoke with a vocalization, experienced brief R facial twitching and unrespnsoiveness. He is taking levetiracetam 500 q12.  \par \par Mr. CULP minimized his symptoms and instead endorsed that his problem was aggravation he was getting from outside causes.  Mr. CULP's family endorsed that he is significantly more irritable since starting Keppra. \par \par *** from Dr. Slade's note 10/11/18 ***

## 2019-08-02 ENCOUNTER — APPOINTMENT (OUTPATIENT)
Dept: NEUROLOGY | Facility: CLINIC | Age: 69
End: 2019-08-02

## 2019-09-30 ENCOUNTER — OUTPATIENT (OUTPATIENT)
Dept: OUTPATIENT SERVICES | Facility: HOSPITAL | Age: 69
LOS: 1 days | End: 2019-09-30
Payer: MEDICARE

## 2019-09-30 VITALS
WEIGHT: 138.89 LBS | DIASTOLIC BLOOD PRESSURE: 68 MMHG | SYSTOLIC BLOOD PRESSURE: 104 MMHG | RESPIRATION RATE: 16 BRPM | HEART RATE: 62 BPM | TEMPERATURE: 98 F | OXYGEN SATURATION: 99 % | HEIGHT: 67.5 IN

## 2019-09-30 DIAGNOSIS — H40.9 UNSPECIFIED GLAUCOMA: Chronic | ICD-10-CM

## 2019-09-30 DIAGNOSIS — Z98.49 CATARACT EXTRACTION STATUS, UNSPECIFIED EYE: Chronic | ICD-10-CM

## 2019-09-30 DIAGNOSIS — H26.9 UNSPECIFIED CATARACT: Chronic | ICD-10-CM

## 2019-09-30 DIAGNOSIS — I25.10 ATHEROSCLEROTIC HEART DISEASE OF NATIVE CORONARY ARTERY WITHOUT ANGINA PECTORIS: ICD-10-CM

## 2019-09-30 DIAGNOSIS — Z95.1 PRESENCE OF AORTOCORONARY BYPASS GRAFT: Chronic | ICD-10-CM

## 2019-09-30 DIAGNOSIS — I25.10 ATHEROSCLEROTIC HEART DISEASE OF NATIVE CORONARY ARTERY WITHOUT ANGINA PECTORIS: Chronic | ICD-10-CM

## 2019-09-30 LAB
ALBUMIN SERPL ELPH-MCNC: 4.1 G/DL — SIGNIFICANT CHANGE UP (ref 3.3–5)
ALP SERPL-CCNC: 47 U/L — SIGNIFICANT CHANGE UP (ref 40–120)
ALT FLD-CCNC: 18 U/L — SIGNIFICANT CHANGE UP (ref 10–45)
ANION GAP SERPL CALC-SCNC: 10 MMOL/L — SIGNIFICANT CHANGE UP (ref 5–17)
AST SERPL-CCNC: 17 U/L — SIGNIFICANT CHANGE UP (ref 10–40)
BILIRUB SERPL-MCNC: 0.3 MG/DL — SIGNIFICANT CHANGE UP (ref 0.2–1.2)
BUN SERPL-MCNC: 14 MG/DL — SIGNIFICANT CHANGE UP (ref 7–23)
CALCIUM SERPL-MCNC: 9.4 MG/DL — SIGNIFICANT CHANGE UP (ref 8.4–10.5)
CHLORIDE SERPL-SCNC: 102 MMOL/L — SIGNIFICANT CHANGE UP (ref 96–108)
CO2 SERPL-SCNC: 27 MMOL/L — SIGNIFICANT CHANGE UP (ref 22–31)
CREAT SERPL-MCNC: 1.08 MG/DL — SIGNIFICANT CHANGE UP (ref 0.5–1.3)
GLUCOSE BLDC GLUCOMTR-MCNC: 94 MG/DL — SIGNIFICANT CHANGE UP (ref 70–99)
GLUCOSE SERPL-MCNC: 104 MG/DL — HIGH (ref 70–99)
HCT VFR BLD CALC: 37.7 % — LOW (ref 39–50)
HGB BLD-MCNC: 12.4 G/DL — LOW (ref 13–17)
MCHC RBC-ENTMCNC: 32 PG — SIGNIFICANT CHANGE UP (ref 27–34)
MCHC RBC-ENTMCNC: 32.8 GM/DL — SIGNIFICANT CHANGE UP (ref 32–36)
MCV RBC AUTO: 97.6 FL — SIGNIFICANT CHANGE UP (ref 80–100)
PLATELET # BLD AUTO: 144 K/UL — LOW (ref 150–400)
POTASSIUM SERPL-MCNC: 4 MMOL/L — SIGNIFICANT CHANGE UP (ref 3.5–5.3)
POTASSIUM SERPL-SCNC: 4 MMOL/L — SIGNIFICANT CHANGE UP (ref 3.5–5.3)
PROT SERPL-MCNC: 6.9 G/DL — SIGNIFICANT CHANGE UP (ref 6–8.3)
RBC # BLD: 3.86 M/UL — LOW (ref 4.2–5.8)
RBC # FLD: 12 % — SIGNIFICANT CHANGE UP (ref 10.3–14.5)
SODIUM SERPL-SCNC: 139 MMOL/L — SIGNIFICANT CHANGE UP (ref 135–145)
WBC # BLD: 6.8 K/UL — SIGNIFICANT CHANGE UP (ref 3.8–10.5)
WBC # FLD AUTO: 6.8 K/UL — SIGNIFICANT CHANGE UP (ref 3.8–10.5)

## 2019-09-30 PROCEDURE — C1769: CPT

## 2019-09-30 PROCEDURE — 80053 COMPREHEN METABOLIC PANEL: CPT

## 2019-09-30 PROCEDURE — 82962 GLUCOSE BLOOD TEST: CPT

## 2019-09-30 PROCEDURE — 85027 COMPLETE CBC AUTOMATED: CPT

## 2019-09-30 PROCEDURE — 99152 MOD SED SAME PHYS/QHP 5/>YRS: CPT | Mod: GC

## 2019-09-30 PROCEDURE — 93005 ELECTROCARDIOGRAM TRACING: CPT

## 2019-09-30 PROCEDURE — 93010 ELECTROCARDIOGRAM REPORT: CPT

## 2019-09-30 PROCEDURE — 99152 MOD SED SAME PHYS/QHP 5/>YRS: CPT

## 2019-09-30 PROCEDURE — 93459 L HRT ART/GRFT ANGIO: CPT

## 2019-09-30 PROCEDURE — C1894: CPT

## 2019-09-30 PROCEDURE — 93459 L HRT ART/GRFT ANGIO: CPT | Mod: 26,GC

## 2019-09-30 PROCEDURE — C1887: CPT

## 2019-09-30 RX ORDER — ROSUVASTATIN CALCIUM 5 MG/1
1 TABLET ORAL
Qty: 0 | Refills: 0 | DISCHARGE

## 2019-09-30 RX ORDER — SODIUM CHLORIDE 9 MG/ML
3 INJECTION INTRAMUSCULAR; INTRAVENOUS; SUBCUTANEOUS EVERY 8 HOURS
Refills: 0 | Status: DISCONTINUED | OUTPATIENT
Start: 2019-09-30 | End: 2019-10-19

## 2019-09-30 RX ORDER — OMEPRAZOLE 10 MG/1
1 CAPSULE, DELAYED RELEASE ORAL
Qty: 0 | Refills: 0 | DISCHARGE

## 2019-09-30 NOTE — H&P CARDIOLOGY - HISTORY OF PRESENT ILLNESS
68 yo male no implantable device with PMHx of CAD s/p CABG 2008 and multiple stents (on ASA), HTN, HLD, DMT2 (last A1C around 7 3 months ago), subdural hematoma 10/2018 after mechanical fall (not required surgery) presents for cardiac cath. pt reports he gets chest tightness after 2-3 blocks of walking relieved with rest, was evaluated by Dr. Foster and referred for cath. PT denies chest pain, SOB or dizziness. 68 yo male no implantable device with PMHx of CAD s/p CABG 2008 and multiple stents (last stent in [facility] 11/2016 on ASA), HTN, HLD, DMT2 (last A1C around 7 3 months ago), subdural hematoma 10/2018 after mechanical fall (not required surgery) presents for cardiac cath. pt reports he gets chest tightness after 2-3 blocks of walking relieved with rest, was evaluated by Dr. Foster and referred for cath. PT denies chest pain, SOB or dizziness.     < from: Cardiac Cath Lab - Adult (12.18.17 @ 12:50) >  CORONARY VESSELS: The coronary circulation is right dominant.  LM:   --  LM: Angiography showed moderate atherosclerosis.  LAD:   --  Proximal LAD: There was a 100 % stenosis.  CX:   --  Proximal circumflex: There was a 100 % stenosis.  RCA:   --  Proximal RCA: There was a 100 % stenosis.  GRAFTS:   --  Graft to the LAD: The graft was a LIMA. Graft angiography showed no evidence of disease. < end of copied text >

## 2019-09-30 NOTE — H&P CARDIOLOGY - PMH
BPH (benign prostatic hyperplasia)    CAD (coronary artery disease)    Diabetes    Glaucoma    HLD (hyperlipidemia)    HTN (hypertension)    Stented coronary artery    Subdural hematoma, post-traumatic  2018

## 2019-10-01 PROBLEM — S06.5X9A TRAUMATIC SUBDURAL HEMORRHAGE WITH LOSS OF CONSCIOUSNESS OF UNSPECIFIED DURATION, INITIAL ENCOUNTER: Chronic | Status: ACTIVE | Noted: 2019-09-30

## 2019-11-06 ENCOUNTER — APPOINTMENT (OUTPATIENT)
Dept: GERIATRICS | Facility: CLINIC | Age: 69
End: 2019-11-06
Payer: MEDICARE

## 2019-11-06 VITALS
DIASTOLIC BLOOD PRESSURE: 60 MMHG | HEIGHT: 67 IN | WEIGHT: 144 LBS | RESPIRATION RATE: 16 BRPM | BODY MASS INDEX: 22.6 KG/M2 | OXYGEN SATURATION: 97 % | SYSTOLIC BLOOD PRESSURE: 100 MMHG | TEMPERATURE: 97.4 F | HEART RATE: 62 BPM

## 2019-11-06 DIAGNOSIS — E11.9 TYPE 2 DIABETES MELLITUS W/OUT COMPLICATIONS: ICD-10-CM

## 2019-11-06 DIAGNOSIS — G40.109 LOCALIZATION-RELATED (FOCAL) (PARTIAL) SYMPTOMATIC EPILEPSY AND EPILEPTIC SYNDROMES WITH SIMPLE PARTIAL SEIZURES, NOT INTRACTABLE, W/OUT STATUS EPILEPTICUS: ICD-10-CM

## 2019-11-06 DIAGNOSIS — I25.10 ATHEROSCLEROTIC HEART DISEASE OF NATIVE CORONARY ARTERY W/OUT ANGINA PECTORIS: ICD-10-CM

## 2019-11-06 DIAGNOSIS — S06.5X9A TRAUMATIC SUBDURAL HEMORRHAGE WITH LOSS OF CONSCIOUSNESS OF UNSPECIFIED DURATION, INITIAL ENCOUNTER: ICD-10-CM

## 2019-11-06 DIAGNOSIS — R41.3 OTHER AMNESIA: ICD-10-CM

## 2019-11-06 PROCEDURE — 99205 OFFICE O/P NEW HI 60 MIN: CPT

## 2019-11-06 NOTE — SOCIAL HISTORY
[No falls in past year] : Patient reported no falls in the past year [Fully functional (bathing, dressing, toileting, transferring, walking, feeding)] : Fully functional (bathing, dressing, toileting, transferring, walking, feeding) [Fully functional (using the telephone, shopping, preparing meals, housekeeping, doing laundry, using transportation,] : Fully functional and needs no help or supervision to perform IADLs (using the telephone, shopping, preparing meals, housekeeping, doing laundry, using transportation, managing medications and managing finances) [FreeTextEntry1] : spouse [Seat Belt] : does not use seat belt

## 2019-11-08 NOTE — PHYSICAL EXAM
[General Appearance - Alert] : alert [General Appearance - In No Acute Distress] : in no acute distress [Sclera] : the sclera and conjunctiva were normal [PERRL With Normal Accommodation] : pupils were equal in size, round, and reactive to light [Extraocular Movements] : extraocular movements were intact [Normal Oral Mucosa] : normal oral mucosa [No Oral Pallor] : no oral pallor [No Oral Cyanosis] : no oral cyanosis [Outer Ear] : the ears and nose were normal in appearance [Oropharynx] : The oropharynx was normal [Neck Appearance] : the appearance of the neck was normal [Neck Cervical Mass (___cm)] : no neck mass was observed [Jugular Venous Distention Increased] : there was no jugular-venous distention [Thyroid Diffuse Enlargement] : the thyroid was not enlarged [Thyroid Nodule] : there were no palpable thyroid nodules [Auscultation Breath Sounds / Voice Sounds] : lungs were clear to auscultation bilaterally [Heart Rate And Rhythm] : heart rate was normal and rhythm regular [Heart Sounds] : normal S1 and S2 [Heart Sounds Gallop] : no gallops [Murmurs] : no murmurs [Heart Sounds Pericardial Friction Rub] : no pericardial rub [Edema] : there was no peripheral edema [Veins - Varicosity Changes] : there were no varicosital changes [Bowel Sounds] : normal bowel sounds [Abdomen Soft] : soft [Abdomen Tenderness] : non-tender [] : no hepato-splenomegaly [Abdomen Mass (___ Cm)] : no abdominal mass palpated [Abnormal Walk] : normal gait [Nail Clubbing] : no clubbing  or cyanosis of the fingernails [Musculoskeletal - Swelling] : no joint swelling seen [Motor Tone] : muscle strength and tone were normal [Sensation] : the sensory exam was normal to light touch and pinprick [Motor Exam] : the motor exam was normal [No Focal Deficits] : no focal deficits [Total Score ___ / 30] : the patient achieved a score of [unfilled] /30 [Normal] : affect was normal and insight and judgment were intact

## 2019-11-11 NOTE — ASSESSMENT
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 Lin. \par \par Note: this is a note entered in error. Please refer to the Portal Note (New) from 11/6.

## 2019-11-11 NOTE — REVIEW OF SYSTEMS
[Vision Problems] : vision problems [Hearing Loss] : hearing loss [Chest Pain] : chest pain [Muscle Weakness] : muscle weakness [Unsteady Walk] : ataxia [Memory Loss] : memory loss [Eyesight Problems] : eyesight problems [Loss Of Hearing] : hearing loss [Constipation] : constipation [Negative] : Heme/Lymph [Fever] : no fever [Chills] : no chills [Fatigue] : no fatigue [Recent Change In Weight] : ~T no recent weight change [Discharge] : no discharge [Earache] : no earache [Palpitations] : no palpitations [Pain] : no pain [Lower Ext Edema] : no lower extremity edema [Shortness Of Breath] : no shortness of breath [Cough] : no cough [Abdominal Pain] : no abdominal pain [Vomiting] : no vomiting [Nausea] : no nausea [Dysuria] : no dysuria [Hesitancy] : no hesitancy [Hematuria] : no hematuria [Dizziness] : no dizziness

## 2019-11-11 NOTE — REVIEW OF SYSTEMS
[Vision Problems] : vision problems [Hearing Loss] : hearing loss [Chest Pain] : chest pain [Muscle Weakness] : muscle weakness [Unsteady Walk] : ataxia [Memory Loss] : memory loss [Eyesight Problems] : eyesight problems [Loss Of Hearing] : hearing loss [Constipation] : constipation [Negative] : Heme/Lymph [Fever] : no fever [Chills] : no chills [Fatigue] : no fatigue [Recent Change In Weight] : ~T no recent weight change [Discharge] : no discharge [Earache] : no earache [Pain] : no pain [Palpitations] : no palpitations [Lower Ext Edema] : no lower extremity edema [Shortness Of Breath] : no shortness of breath [Cough] : no cough [Abdominal Pain] : no abdominal pain [Nausea] : no nausea [Vomiting] : no vomiting [Dysuria] : no dysuria [Hesitancy] : no hesitancy [Hematuria] : no hematuria [Dizziness] : no dizziness

## 2019-11-11 NOTE — ASSESSMENT
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 Lin. \par \par Note: this is a note entered in error. Please refer to the Portal Note (New) from 11/6.

## 2019-11-11 NOTE — END OF VISIT
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 PMH:  T2DM, HLD, subdural hematoma (Oct 2018), CABG w/ 2 stents placement in 2008 .\par cognize declined after the hematoma. Memory and word finding problems. \par Plan. Would strongly recommend involving patient in social gathering- perhaps volunteering with pharmacist education groups.  [] : Resident

## 2019-11-11 NOTE — END OF VISIT
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 PMH:  T2DM, HLD, subdural hematoma (Oct 2018), CABG w/ 2 stents placement in 2008 .\par cognize declined after the hematoma. Memory and word finding problems. \par Plan. Would strongly recommend involving patient in social gathering- perhaps volunteering with pharmacist education groups.
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
## 2019-11-12 NOTE — DISCHARGE NOTE ADULT - FLU SEASON?
Yes... Consent (Lip)/Introductory Paragraph: The rationale for Mohs was explained to the patient and consent was obtained. The risks, benefits and alternatives to therapy were discussed in detail. Specifically, the risks of lip deformity, changes in the oral aperture, infection, scarring, bleeding, prolonged wound healing, incomplete removal, allergy to anesthesia, nerve injury and recurrence were addressed. Prior to the procedure, the treatment site was clearly identified and confirmed by the patient. All components of Universal Protocol/PAUSE Rule completed.

## 2020-01-17 ENCOUNTER — APPOINTMENT (OUTPATIENT)
Dept: CT IMAGING | Facility: IMAGING CENTER | Age: 70
End: 2020-01-17
Payer: MEDICARE

## 2020-01-17 ENCOUNTER — OUTPATIENT (OUTPATIENT)
Dept: OUTPATIENT SERVICES | Facility: HOSPITAL | Age: 70
LOS: 1 days | End: 2020-01-17
Payer: MEDICARE

## 2020-01-17 DIAGNOSIS — I25.10 ATHEROSCLEROTIC HEART DISEASE OF NATIVE CORONARY ARTERY WITHOUT ANGINA PECTORIS: Chronic | ICD-10-CM

## 2020-01-17 DIAGNOSIS — H40.9 UNSPECIFIED GLAUCOMA: Chronic | ICD-10-CM

## 2020-01-17 DIAGNOSIS — Z00.8 ENCOUNTER FOR OTHER GENERAL EXAMINATION: ICD-10-CM

## 2020-01-17 DIAGNOSIS — Z98.49 CATARACT EXTRACTION STATUS, UNSPECIFIED EYE: Chronic | ICD-10-CM

## 2020-01-17 DIAGNOSIS — Z95.1 PRESENCE OF AORTOCORONARY BYPASS GRAFT: Chronic | ICD-10-CM

## 2020-01-17 DIAGNOSIS — H26.9 UNSPECIFIED CATARACT: Chronic | ICD-10-CM

## 2020-01-17 PROCEDURE — 74176 CT ABD & PELVIS W/O CONTRAST: CPT

## 2020-01-18 PROCEDURE — 74176 CT ABD & PELVIS W/O CONTRAST: CPT | Mod: 26

## 2020-07-16 NOTE — CONSULT NOTE ADULT - ASSESSMENT
Assessment completed with Papi.    LOC: alert     Dx: Osteomyelitis  Chronic Disease Management: DM, PAD, dyslipidemia    Lives with: spouse Lynnette  Living at:  Home in [place]  Transportation: YES, drives self    Primary PCP: Tristian Thornton  Insurance:  Medicare and BCBS  Medicare: Inpatient letter reviewed with Papi.    Support System:  family  Homecare/PCA: no  /County Services:   no  [place]: NO      How was the [facility] notification completed: n/a    Health Care Directive: on file at [facility]  Guardian: no  POA: no    Pharmacy: [facility] [place]  Meds management: manages own    Adequate Resources for needs (housing, utilities, food/med): YES  Household chores: Papi and Kathleen do the chores  Work/community/social activity: YES, as desired    ADLs: independent  Ambulation:independent  Falls: no  Nutrition: shops and cooks self  Sleep: broken sleep due to monitoring his spouse with dementia    Equipment used: none      Oxygen supplier: no      Does the supplier have valid oxygen orders: n/a    Mental health: depression  Substance abuse: no  Exposure to violence/abuse: no  Stressors: many stressors due to wife with advanced dementia and is on Palliative Skilled. He stated she stopped eating about a week ago.    Able to Return to Prior Living Arrangements: YES    Choice of Vendor: n/a    Barriers: having to provide care to his spouse while healing himself    EDEL: low    Plan: home transported by family.      A: 67 yo male w h/o CAD s/p CABG (2008), HTN, HLD who presented to ED after mechanical slip and fall one week ago with headache, found to have SDH.    1. CAD s/p CABG     - ASA and Plavix held in setting of acute SDH. Neurosurgical input appreciated. Once bleed is stable and safe from neurosurgical perspective, ASA should be resumed, followed by Plavix.     - Would increase Lipitor to 80 daily     - c/w Toprol XL 25, Lisinopril 2.5, Ranexa 1000 BID    Jason Ellis MD  Cardiology Fellow  p: [phone] A: 69 yo male w h/o extensive CAD s/p CABG (2008), HTN, HLD who presented to ED after mechanical slip and fall one week ago with headache, found to have SDH.    1. CAD s/p CABG     - ASA and Plavix held in setting of acute SDH. Neurosurgical input appreciated. Once bleed is stable and safe from neurosurgical perspective, ASA should be resumed, followed by Plavix.     - Would increase Lipitor to 80 daily     - c/w Toprol XL 25, Lisinopril 2.5, Ranexa 1000 BID    Jason Ellis MD  Cardiology Fellow  p: [phone]

## 2020-11-05 NOTE — ED PROVIDER NOTE - CROS ED ROS STATEMENT
all other ROS negative except as per HPI Carac Counseling:  I discussed with the patient the risks of Carac including but not limited to erythema, scaling, itching, weeping, crusting, and pain. No

## 2021-06-24 ENCOUNTER — OUTPATIENT (OUTPATIENT)
Dept: OUTPATIENT SERVICES | Facility: HOSPITAL | Age: 71
LOS: 1 days | End: 2021-06-24
Payer: MEDICARE

## 2021-06-24 ENCOUNTER — APPOINTMENT (OUTPATIENT)
Dept: RADIOLOGY | Facility: IMAGING CENTER | Age: 71
End: 2021-06-24
Payer: MEDICARE

## 2021-06-24 DIAGNOSIS — I25.10 ATHEROSCLEROTIC HEART DISEASE OF NATIVE CORONARY ARTERY WITHOUT ANGINA PECTORIS: Chronic | ICD-10-CM

## 2021-06-24 DIAGNOSIS — Z98.49 CATARACT EXTRACTION STATUS, UNSPECIFIED EYE: Chronic | ICD-10-CM

## 2021-06-24 DIAGNOSIS — H40.9 UNSPECIFIED GLAUCOMA: Chronic | ICD-10-CM

## 2021-06-24 DIAGNOSIS — H26.9 UNSPECIFIED CATARACT: Chronic | ICD-10-CM

## 2021-06-24 DIAGNOSIS — Z95.1 PRESENCE OF AORTOCORONARY BYPASS GRAFT: Chronic | ICD-10-CM

## 2021-06-24 DIAGNOSIS — Z00.8 ENCOUNTER FOR OTHER GENERAL EXAMINATION: ICD-10-CM

## 2021-06-24 PROCEDURE — 77080 DXA BONE DENSITY AXIAL: CPT

## 2021-06-24 PROCEDURE — 72110 X-RAY EXAM L-2 SPINE 4/>VWS: CPT

## 2021-06-24 PROCEDURE — 77080 DXA BONE DENSITY AXIAL: CPT | Mod: 26

## 2021-06-24 PROCEDURE — 72110 X-RAY EXAM L-2 SPINE 4/>VWS: CPT | Mod: 26

## 2021-07-06 ENCOUNTER — APPOINTMENT (OUTPATIENT)
Dept: NEUROSURGERY | Facility: CLINIC | Age: 71
End: 2021-07-06
Payer: MEDICARE

## 2021-07-06 VITALS
SYSTOLIC BLOOD PRESSURE: 98 MMHG | DIASTOLIC BLOOD PRESSURE: 60 MMHG | TEMPERATURE: 98.1 F | WEIGHT: 144 LBS | BODY MASS INDEX: 22.6 KG/M2 | OXYGEN SATURATION: 99 % | HEART RATE: 73 BPM | HEIGHT: 67 IN

## 2021-07-06 DIAGNOSIS — M54.5 LOW BACK PAIN: ICD-10-CM

## 2021-07-06 DIAGNOSIS — M51.36 OTHER INTERVERTEBRAL DISC DEGENERATION, LUMBAR REGION: ICD-10-CM

## 2021-07-06 DIAGNOSIS — M54.16 RADICULOPATHY, LUMBAR REGION: ICD-10-CM

## 2021-07-06 DIAGNOSIS — M48.061 SPINAL STENOSIS, LUMBAR REGION WITHOUT NEUROGENIC CLAUDICATION: ICD-10-CM

## 2021-07-06 PROCEDURE — 99213 OFFICE O/P EST LOW 20 MIN: CPT

## 2021-07-06 RX ORDER — MELOXICAM 7.5 MG/1
7.5 TABLET ORAL TWICE DAILY
Qty: 60 | Refills: 3 | Status: ACTIVE | COMMUNITY
Start: 2021-07-06 | End: 1900-01-01

## 2021-07-06 RX ORDER — LACOSAMIDE 50 MG/1
50 TABLET, FILM COATED ORAL
Qty: 60 | Refills: 5 | Status: DISCONTINUED | COMMUNITY
Start: 2018-10-19 | End: 2021-07-06

## 2021-07-06 RX ORDER — OMEGA-3-ACID ETHYL ESTERS CAPSULES 1 G/1
1 CAPSULE, LIQUID FILLED ORAL
Qty: 120 | Refills: 0 | Status: DISCONTINUED | COMMUNITY
Start: 2017-01-23 | End: 2021-07-06

## 2021-07-06 RX ORDER — METOPROLOL SUCCINATE 25 MG/1
25 TABLET, EXTENDED RELEASE ORAL
Qty: 90 | Refills: 0 | Status: DISCONTINUED | COMMUNITY
Start: 2017-07-14 | End: 2021-07-06

## 2021-07-06 RX ORDER — DAPAGLIFLOZIN 5 MG/1
5 TABLET, FILM COATED ORAL
Qty: 90 | Refills: 0 | Status: DISCONTINUED | COMMUNITY
Start: 2017-07-14 | End: 2021-07-06

## 2021-07-06 RX ORDER — GLIPIZIDE 5 MG/1
5 TABLET ORAL
Refills: 0 | Status: ACTIVE | COMMUNITY

## 2021-07-06 RX ORDER — ISOSORBIDE MONONITRATE 30 MG/1
30 TABLET, EXTENDED RELEASE ORAL
Qty: 30 | Refills: 0 | Status: DISCONTINUED | COMMUNITY
Start: 2017-03-15 | End: 2021-07-06

## 2021-07-06 RX ORDER — VITAMIN E 268 MG
CAPSULE ORAL
Refills: 0 | Status: ACTIVE | COMMUNITY

## 2021-07-06 RX ORDER — TIZANIDINE HYDROCHLORIDE 2 MG/1
2 CAPSULE ORAL
Qty: 60 | Refills: 3 | Status: ACTIVE | COMMUNITY
Start: 2021-07-06 | End: 1900-01-01

## 2021-07-06 RX ORDER — TRAMADOL HYDROCHLORIDE 50 MG/1
50 TABLET, COATED ORAL
Qty: 90 | Refills: 3 | Status: ACTIVE | COMMUNITY
Start: 2021-07-06 | End: 1900-01-01

## 2021-07-06 NOTE — PHYSICAL EXAM
[General Appearance - Alert] : alert [Mood] : the mood was normal [Motor Strength] : muscle strength was normal in all four extremities [Sensation Tactile Decrease] : light touch was intact [1+] : Ankle jerk left 1+ [Straight-Leg Raise Test - Left] : straight leg raise of the left leg was negative [Straight-Leg Raise Test - Right] : straight leg raise  of the right leg was negative [Able to toe walk] : the patient was not able to toe walk [Able to heel walk] : the patient was not able to heel walk [No Spinal Tenderness] : no spinal tenderness [] : no rash

## 2021-07-06 NOTE — ASSESSMENT
[FreeTextEntry1] : 71 year old male with low back and lumbar radicular pain.  Given the nature of the patient's complaints and lack of significant improvement following more conservative measures, we will obtain MRI lumbar spine to help delineate the exact etiology of the patient's pain.  The patient will return to see me once the study is complete so that we may review the results and discuss further treatment options.\par

## 2021-07-06 NOTE — HISTORY OF PRESENT ILLNESS
[Back] : back [___ wks] : [unfilled] week(s) ago [10] : a maximum pain level of 10/10 [Sharp] : sharp [Burning] : burning [Right] : right [Anterior] : anterior aspect of the [Ankle] : ankle [Standing] : standing [Walking] : walking [Sitting] : sitting [Insomnia] : insomnia [Gait Dysfunction] : gait dysfunction [PT] : PT [Medications] : medications [FreeTextEntry6] : anti-inflammatories, Tylenol, Tramadol

## 2021-07-25 ENCOUNTER — APPOINTMENT (OUTPATIENT)
Dept: MRI IMAGING | Facility: IMAGING CENTER | Age: 71
End: 2021-07-25
Payer: MEDICARE

## 2021-07-25 ENCOUNTER — OUTPATIENT (OUTPATIENT)
Dept: OUTPATIENT SERVICES | Facility: HOSPITAL | Age: 71
LOS: 1 days | End: 2021-07-25
Payer: MEDICARE

## 2021-07-25 DIAGNOSIS — Z98.49 CATARACT EXTRACTION STATUS, UNSPECIFIED EYE: Chronic | ICD-10-CM

## 2021-07-25 DIAGNOSIS — I25.10 ATHEROSCLEROTIC HEART DISEASE OF NATIVE CORONARY ARTERY WITHOUT ANGINA PECTORIS: Chronic | ICD-10-CM

## 2021-07-25 DIAGNOSIS — Z00.8 ENCOUNTER FOR OTHER GENERAL EXAMINATION: ICD-10-CM

## 2021-07-25 DIAGNOSIS — H26.9 UNSPECIFIED CATARACT: Chronic | ICD-10-CM

## 2021-07-25 DIAGNOSIS — Z95.1 PRESENCE OF AORTOCORONARY BYPASS GRAFT: Chronic | ICD-10-CM

## 2021-07-25 DIAGNOSIS — H40.9 UNSPECIFIED GLAUCOMA: Chronic | ICD-10-CM

## 2021-07-25 PROCEDURE — G1004: CPT

## 2021-07-25 PROCEDURE — 72148 MRI LUMBAR SPINE W/O DYE: CPT

## 2021-07-25 PROCEDURE — 72148 MRI LUMBAR SPINE W/O DYE: CPT | Mod: 26,ME

## 2021-09-13 ENCOUNTER — RX RENEWAL (OUTPATIENT)
Age: 71
End: 2021-09-13

## 2021-09-15 ENCOUNTER — RX RENEWAL (OUTPATIENT)
Age: 71
End: 2021-09-15

## 2022-03-03 ENCOUNTER — OUTPATIENT (OUTPATIENT)
Dept: OUTPATIENT SERVICES | Facility: HOSPITAL | Age: 72
LOS: 1 days | End: 2022-03-03
Payer: MEDICARE

## 2022-03-03 ENCOUNTER — RESULT REVIEW (OUTPATIENT)
Age: 72
End: 2022-03-03

## 2022-03-03 VITALS
DIASTOLIC BLOOD PRESSURE: 69 MMHG | RESPIRATION RATE: 16 BRPM | HEIGHT: 68 IN | WEIGHT: 141.98 LBS | OXYGEN SATURATION: 100 % | HEART RATE: 79 BPM | SYSTOLIC BLOOD PRESSURE: 132 MMHG | TEMPERATURE: 97 F

## 2022-03-03 VITALS
OXYGEN SATURATION: 100 % | DIASTOLIC BLOOD PRESSURE: 77 MMHG | RESPIRATION RATE: 15 BRPM | SYSTOLIC BLOOD PRESSURE: 133 MMHG | HEART RATE: 72 BPM

## 2022-03-03 DIAGNOSIS — Z86.010 PERSONAL HISTORY OF COLONIC POLYPS: ICD-10-CM

## 2022-03-03 DIAGNOSIS — H26.9 UNSPECIFIED CATARACT: Chronic | ICD-10-CM

## 2022-03-03 DIAGNOSIS — I25.10 ATHEROSCLEROTIC HEART DISEASE OF NATIVE CORONARY ARTERY WITHOUT ANGINA PECTORIS: Chronic | ICD-10-CM

## 2022-03-03 DIAGNOSIS — Z95.1 PRESENCE OF AORTOCORONARY BYPASS GRAFT: Chronic | ICD-10-CM

## 2022-03-03 DIAGNOSIS — Z98.49 CATARACT EXTRACTION STATUS, UNSPECIFIED EYE: Chronic | ICD-10-CM

## 2022-03-03 DIAGNOSIS — H40.9 UNSPECIFIED GLAUCOMA: Chronic | ICD-10-CM

## 2022-03-03 LAB
GLUCOSE BLDC GLUCOMTR-MCNC: 70 MG/DL — SIGNIFICANT CHANGE UP (ref 70–99)
RAPID RVP RESULT: DETECTED
RV+EV RNA SPEC QL NAA+PROBE: DETECTED
SARS-COV-2 RNA SPEC QL NAA+PROBE: SIGNIFICANT CHANGE UP

## 2022-03-03 PROCEDURE — 88305 TISSUE EXAM BY PATHOLOGIST: CPT

## 2022-03-03 PROCEDURE — 45385 COLONOSCOPY W/LESION REMOVAL: CPT | Mod: PT

## 2022-03-03 PROCEDURE — 0225U NFCT DS DNA&RNA 21 SARSCOV2: CPT

## 2022-03-03 PROCEDURE — 88305 TISSUE EXAM BY PATHOLOGIST: CPT | Mod: 26

## 2022-03-03 PROCEDURE — 82962 GLUCOSE BLOOD TEST: CPT

## 2022-03-03 DEVICE — NET RETRV ROT ROTH 2.5MMX230CM: Type: IMPLANTABLE DEVICE | Status: FUNCTIONAL

## 2022-03-03 RX ORDER — UBIDECARENONE 100 MG
1 CAPSULE ORAL
Qty: 0 | Refills: 0 | DISCHARGE

## 2022-03-03 RX ORDER — METOPROLOL TARTRATE 50 MG
0.5 TABLET ORAL
Qty: 0 | Refills: 0 | DISCHARGE

## 2022-03-03 RX ORDER — LUTEIN 20 MG
1 CAPSULE ORAL
Qty: 0 | Refills: 0 | DISCHARGE

## 2022-03-03 RX ORDER — EMPAGLIFLOZIN 10 MG/1
1 TABLET, FILM COATED ORAL
Qty: 0 | Refills: 0 | DISCHARGE

## 2022-03-03 RX ORDER — SODIUM CHLORIDE 9 MG/ML
500 INJECTION INTRAMUSCULAR; INTRAVENOUS; SUBCUTANEOUS
Refills: 0 | Status: DISCONTINUED | OUTPATIENT
Start: 2022-03-03 | End: 2022-03-17

## 2022-03-03 RX ORDER — ROSUVASTATIN CALCIUM 5 MG/1
1 TABLET ORAL
Qty: 0 | Refills: 0 | DISCHARGE

## 2022-03-03 RX ORDER — RANOLAZINE 500 MG/1
1 TABLET, FILM COATED, EXTENDED RELEASE ORAL
Qty: 0 | Refills: 0 | DISCHARGE

## 2022-03-03 RX ORDER — LISINOPRIL 2.5 MG/1
1 TABLET ORAL
Qty: 0 | Refills: 0 | DISCHARGE

## 2022-03-03 NOTE — ASU PATIENT PROFILE, ADULT - NSICDXPASTMEDICALHX_GEN_ALL_CORE_FT
PAST MEDICAL HISTORY:  BPH (benign prostatic hyperplasia)     CAD (coronary artery disease)     Diabetes     Glaucoma     HLD (hyperlipidemia)     HTN (hypertension)     Stented coronary artery     Subdural hematoma, post-traumatic 2018

## 2022-03-03 NOTE — ASU DISCHARGE PLAN (ADULT/PEDIATRIC) - B. DRINK ALCOHOL, BEER, OR WINE
CC:  Dejah Nieto Statement Selected

## 2022-03-03 NOTE — ASU DISCHARGE PLAN (ADULT/PEDIATRIC) - NO SPORTS/GYM DURATION
Daily Low Vishnu <=14   Vishnu score: 13   No new concerns noted per staff. Interventions in place and documented per protocol. Apply barrier cream daily/PRN. Keep patient dry and turn regularly. Elevate and offload heels.  Contact [facility] for any concerns.    ON JO, settings verified with RN   24 hours

## 2022-03-03 NOTE — ASU DISCHARGE PLAN (ADULT/PEDIATRIC) - NS MD DC FALL RISK RISK
For information on Fall & Injury Prevention, visit: [email or web address] OR  [email or web address] OR  https://www.cdc.gov/steadi/patient.html

## 2022-03-03 NOTE — ASU DISCHARGE PLAN (ADULT/PEDIATRIC) - DRIVING
No... 5-Fu Counseling: 5-Fluorouracil Counseling:  I discussed with the patient the risks of 5-fluorouracil including but not limited to erythema, scaling, itching, weeping, crusting, and pain.

## 2022-03-03 NOTE — ASU PATIENT PROFILE, ADULT - FALL HARM RISK - UNIVERSAL INTERVENTIONS
Bed in lowest position, wheels locked, appropriate side rails in place/Call bell, personal items and telephone in reach/Instruct patient to call for assistance before getting out of bed or chair/Non-slip footwear when patient is out of bed/Williams to call system/Physically safe environment - no spills, clutter or unnecessary equipment/Purposeful Proactive Rounding/Room/bathroom lighting operational, light cord in reach

## 2022-03-03 NOTE — ASU PATIENT PROFILE, ADULT - HOW PATIENT ADDRESSED, PROFILE
SCRIBE #1 NOTE: I, Christal Murdock, am scribing for, and in the presence of, Ruddy Garcia MD. I have scribed the entire note.       History     Chief Complaint   Patient presents with    Abscess     pt c/o worsening abscess to L 2nd finger, unrelieved by Bactroban ointment     Review of patient's allergies indicates:   Allergen Reactions    Pcn [penicillins] Hives    Sulfa (sulfonamide antibiotics) Hives         History of Present Illness     HPI    10/9/2020, 1:08 PM  History obtained from the patient      History of Present Illness: Aniceto Johansen is a 46 y.o. male who presents to the Emergency Department for evaluation of abscess to L index finger which onset a few days ago. Pt was seen on 10/6 and discharged with Bactroban ointment and reports that his sxs have worsened. Symptoms are constant and moderate in severity. Pt's sxs are exacerbated with extension and flexion of L index finger. Associated sxs include redness and pain surrounding abscess region to L index finger. Patient denies any fever, chills, n/v, SOB, CP, dizziness, and all other sxs at this time. No further complaints or concerns at this time.        Arrival mode: Personal transportation      PCP: [facility]      Past Medical History:  History reviewed. No pertinent past medical history.       Past Surgical History:  Past Surgical History:   Procedure Laterality Date    EXPLORATION OF TENDON Left 1/1/2019    Procedure: EXPLORATION, TENDON;  Surgeon: Dany Conway MD;  Location: [facility] OR;  Service: Orthopedics;  Laterality: Left;    HAND SURGERY Left 01/01/2019    I&D L middle finger    HERNIA REPAIR      IRRIGATION AND DEBRIDEMENT OF UPPER EXTREMITY Left 1/1/2019    Procedure: IRRIGATION AND DEBRIDEMENT, UPPER EXTREMITY;  Surgeon: Dany Conway MD;  Location: [facility] OR;  Service: Orthopedics;  Laterality: Left;         Family History:  History reviewed. No pertinent family history.      Social History:    Social History     Tobacco Use    Smoking status: Current Every Day Smoker     Packs/day: 1.00     Types: Cigarettes    Smokeless tobacco: Never Used   Substance and Sexual Activity    Alcohol use: No     Frequency: Never    Drug use: No    Sexual activity: Unknown        Review of Systems     Review of Systems   Constitutional: Negative for chills and fever.   HENT: Negative for congestion and sore throat.    Respiratory: Negative for cough and shortness of breath.    Cardiovascular: Negative for chest pain.   Gastrointestinal: Negative for diarrhea, nausea and vomiting.   Genitourinary: Negative for dysuria.   Musculoskeletal: Negative for back pain.   Skin: Negative for rash.        (+) abscess to L index finger  (+) redness and pain surrounding abscess to L index finger   Neurological: Negative for dizziness, weakness and headaches.   Hematological: Does not bruise/bleed easily.   All other systems reviewed and are negative.       Physical Exam     Initial Vitals [10/09/20 1253]   BP Pulse Resp Temp SpO2   (!) 143/98 (!) 130 20 98.2 °F (36.8 °C) 96 %      MAP       --          Physical Exam  Nursing Notes and Vital Signs Reviewed.  Constitutional: Well-developed and well-nourished.   Head: Atraumatic. Normocephalic.  Eyes: EOM intact. No scleral icterus.  ENT: Mucous membranes are moist. Oropharynx is clear and symmetric.    Neck: Supple. Full ROM. No lymphadenopathy.  Cardiovascular: Tachycardia. Regular rhythm. No murmurs, rubs, or gallops. Distal pulses are 2+ and symmetric.  Pulmonary/Chest: No respiratory distress. Clear to auscultation bilaterally. No wheezing or rales.  Abdominal: Soft and non-distended.  There is no tenderness.  No rebound, guarding, or rigidity.  Genitourinary: No CVA tenderness  Musculoskeletal: Moves all extremities. No obvious deformities.   Left Hand: No obvious deformity. 1.5 cm fluctuant and tender abscess to radial side of L index finger. Tenderness along dorsal aspect of  "second metatarsal region and adjacent dorsal wrist region. No flexor surface tenderness. Severe finger nail clubbing. Radial, median, and ulnar nerves are intact. Radial and ulnar pulses are 2+. Normal capillary refill.  Distal sensation is intact. NVI distally.   Skin: Warm and dry.  Neurological:  Alert, awake, and appropriate.  Normal speech.  No acute focal neurological deficits are appreciated.  Psychiatric: Normal affect. Good eye contact. Appropriate in content.     ED Course   I & D - Incision and Drainage    Date/Time: 10/9/2020 3:03 PM  Location procedure was performed: [facility] EMERGENCY DEPARTMENT  Performed by: Ruddy Garcia MD  Authorized by: Ruddy Garcia MD   Consent Done: Yes  Consent: Verbal consent obtained.  Risks and benefits: risks, benefits and alternatives were discussed  Consent given by: patient  Patient understanding: patient states understanding of the procedure being performed  Patient consent: the patient's understanding of the procedure matches consent given  Required items: required blood products, implants, devices, and special equipment available  Patient identity confirmed: , name, verbally with patient, provided demographic data and MRN  Time out: Immediately prior to procedure a "time out" was called to verify the correct patient, procedure, equipment, support staff and site/side marked as required.  Type: abscess  Body area: upper extremity  Location details: left index finger  Anesthesia: local infiltration    Anesthesia:  Local Anesthetic: lidocaine 1% without epinephrine  Anesthetic total: 0.5 mL  Patient sedated: no  Risk factor: underlying major vessel and  coagulopathy  Scalpel size: 11  Incision type: single straight  Complexity: simple  Drainage: purulent  Drainage amount: copious  Wound treatment: incision,  drainage and  wound left open  Packing material: none  Complications: No  Specimens: No  Implants: No  Patient tolerance: Patient tolerated the procedure " "well with no immediate complications  Comments: After I&D, pt states that he has no more joint pain or hand pain anymore with FROM in L index finger.         ED Vital Signs:  Vitals:    10/09/20 1253 10/09/20 1535   BP: (!) 143/98 (!) 140/97   Pulse: (!) 130 75   Resp: 20 18   Temp: 98.2 °F (36.8 °C)    TempSrc: Oral    SpO2: 96% 99%   Weight: 51.9 kg (114 lb 6.7 oz)    Height: 5' 7" (1.702 m)        Abnormal Lab Results:  Labs Reviewed   CBC W/ AUTO DIFFERENTIAL - Abnormal; Notable for the following components:       Result Value    MPV 9.0 (*)     Eos # 0.8 (*)     Lymph% 15.7 (*)     All other components within normal limits   SEDIMENTATION RATE - Abnormal; Notable for the following components:    Sed Rate 32 (*)     All other components within normal limits   CULTURE, BLOOD   CULTURE, BLOOD   HIV 1 / 2 ANTIBODY   HEPATITIS C ANTIBODY   COMPREHENSIVE METABOLIC PANEL   LACTIC ACID, PLASMA   C-REACTIVE PROTEIN        All Lab Results:  Results for orders placed or performed during the hospital encounter of 10/09/20   Blood culture #1 **CANNOT BE ORDERED STAT**    Specimen: Peripheral, Antecubital, Right; Blood   Result Value Ref Range    Blood Culture, Routine No Growth to date    Blood culture #2 **CANNOT BE ORDERED STAT**    Specimen: Peripheral, Forearm, Right; Blood   Result Value Ref Range    Blood Culture, Routine No Growth to date    HIV 1/2 Ag/Ab (4th Gen)   Result Value Ref Range    HIV 1/2 Ag/Ab Negative Negative   Hepatitis C antibody   Result Value Ref Range    Hepatitis C Ab Negative Negative   CBC auto differential   Result Value Ref Range    WBC 9.36 3.90 - 12.70 K/uL    RBC 5.13 4.60 - 6.20 M/uL    Hemoglobin 15.3 14.0 - 18.0 g/dL    Hematocrit 46.1 40.0 - 54.0 %    Mean Corpuscular Volume 90 82 - 98 fL    Mean Corpuscular Hemoglobin 29.8 27.0 - 31.0 pg    Mean Corpuscular Hemoglobin Conc 33.2 32.0 - 36.0 g/dL    RDW 12.3 11.5 - 14.5 %    Platelets 248 150 - 350 K/uL    MPV 9.0 (L) 9.2 - 12.9 fL    " Immature Granulocytes 0.3 0.0 - 0.5 %    Gran # (ANC) 6.3 1.8 - 7.7 K/uL    Immature Grans (Abs) 0.03 0.00 - 0.04 K/uL    Lymph # 1.5 1.0 - 4.8 K/uL    Mono # 0.8 0.3 - 1.0 K/uL    Eos # 0.8 (H) 0.0 - 0.5 K/uL    Baso # 0.04 0.00 - 0.20 K/uL    nRBC 0 0 /100 WBC    Gran% 66.9 38.0 - 73.0 %    Lymph% 15.7 (L) 18.0 - 48.0 %    Mono% 8.7 4.0 - 15.0 %    Eosinophil% 8.0 0.0 - 8.0 %    Basophil% 0.4 0.0 - 1.9 %    Differential Method Automated    Comprehensive metabolic panel   Result Value Ref Range    Sodium 139 136 - 145 mmol/L    Potassium 4.5 3.5 - 5.1 mmol/L    Chloride 101 95 - 110 mmol/L    CO2 28 23 - 29 mmol/L    Glucose 87 70 - 110 mg/dL    BUN, Bld 14 6 - 20 mg/dL    Creatinine 0.9 0.5 - 1.4 mg/dL    Calcium 9.4 8.7 - 10.5 mg/dL    Total Protein 7.8 6.0 - 8.4 g/dL    Albumin 4.1 3.5 - 5.2 g/dL    Total Bilirubin 0.2 0.1 - 1.0 mg/dL    Alkaline Phosphatase 69 55 - 135 U/L    AST 17 10 - 40 U/L    ALT 17 10 - 44 U/L    Anion Gap 10 8 - 16 mmol/L    eGFR if African American >60 >60 mL/min/1.73 m^2    eGFR if non African American >60 >60 mL/min/1.73 m^2   Lactic acid, plasma   Result Value Ref Range    Lactate (Lactic Acid) 1.3 0.5 - 2.2 mmol/L   Sedimentation rate   Result Value Ref Range    Sed Rate 32 (H) 0 - 10 mm/Hr   C-reactive protein   Result Value Ref Range    CRP 3.6 0.0 - 8.2 mg/L         Imaging Results          X-Ray Hand 3 view Left (Final result)  Result time 10/09/20 14:26:42    Final result by ROSALINE Bautista Sr., MD (10/09/20 14:26:42)                 Impression:      1. There is moderate prominence of the soft tissue thickness lateral and volar to the proximal phalanx of the index finger.  This is consistent with the patient's history and characteristic of cellulitis.  2. There is a 3 mm oval shaped area of increased density volar to the distal aspect of the proximal phalanx of the index finger.  A foreign body cannot be excluded.  3. There is no radiographic evidence of acute  osteomyelitis.      Electronically signed by: Deni Bautista MD  Date:    10/09/2020  Time:    14:26             Narrative:    EXAMINATION:  XR HAND COMPLETE 3 VIEW LEFT    CLINICAL HISTORY:  left hand infection;    COMPARISON:  None    FINDINGS:  There is moderate prominence of the soft tissue thickness lateral and volar to the proximal phalanx of the index finger.  There is a 3 mm oval shaped area of increased density volar to the distal aspect of the proximal phalanx of the index finger.  There is no radiographic evidence of acute osteomyelitis.  There is no fracture. There is no dislocation.                                   The Emergency Provider reviewed the vital signs and test results, which are outlined above.     ED Discussion       3:14 PM: Reassessed pt at this time. Pt has a normal pulse and FROM with no tenderness of wrist, hand, or flexor/extensor regions following I&D of L index finger. Discussed need to return if hand/wrist pain recur.  Pt states his condition has improved at this time. Discussed with pt all pertinent ED information and results. Discussed pt dx and plan of tx. Gave pt all f/u and return to the ED instructions. All questions and concerns were addressed at this time. Pt expresses understanding of information and instructions, and is comfortable with plan to discharge. Pt is stable for discharge.    I discussed with patient and/or family/caretaker that evaluation in the ED does not suggest any emergent or life threatening medical conditions requiring immediate intervention beyond what was provided in the ED, and I believe patient is safe for discharge.  Regardless, an unremarkable evaluation in the ED does not preclude the development or presence of a serious of life threatening condition. As such, patient was instructed to return immediately for any worsening or change in current symptoms.       MDM        Medical Decision Making:   Clinical Tests:   Lab Tests: Ordered and  Reviewed  Radiological Study: Ordered and Reviewed     Additional MDM:   Smoking Cessation: The patient is a smoker. The patient was counseled on smoking cessation for: 3 minutes. The patient was counseled on tobacco related  health complications.        ED Medication(s):  Medications   lactated ringers bolus 1,000 mL (0 mLs Intravenous Stopped 10/9/20 1449)   lidocaine-EPINEPHrine 1%-1:100,000 injection 5 mL (5 mLs Intradermal Given by Other 10/9/20 1348)   clindamycin in D5W 900 mg/50 mL IVPB 900 mg (0 mg Intravenous Stopped 10/9/20 1459)       Discharge Medication List as of 10/9/2020  3:13 PM      START taking these medications    Details   cephALEXin (KEFLEX) 500 MG capsule Take 1 capsule (500 mg total) by mouth 4 (four) times daily. for 5 days, Starting Fri 10/9/2020, Until Wed 10/14/2020, Print      doxycycline (VIBRAMYCIN) 100 MG Cap Take 1 capsule (100 mg total) by mouth 2 (two) times daily. for 10 days, Starting Fri 10/9/2020, Until Mon 10/19/2020, Print             Follow-up Information     [facility] In 3 days.    Specialty: General Practice  Contact information:  [id number]  [facility] [address] [phone]             [facility]    Specialty: Emergency Medicine  Why: As needed, If symptoms worsen  Contact information:  [address] [facility] [address] [phone]                     Scribe Attestation:   Scribe #1: I performed the above scribed service and the documentation accurately describes the services I performed. I attest to the accuracy of the note.     Attending:   Physician Attestation Statement for Scribe #1: I, Ruddy Garcia MD, personally performed the services described in this documentation, as scribed by Christal Murdock, in my presence, and it is both accurate and complete.           Clinical Impression       ICD-10-CM ICD-9-CM   1. Abscess of left index finger  L02.512 681.00       Disposition:   Disposition:  Discharged  Condition: Stable         Ruddy Garcia MD  10/10/20 6355     kit

## 2022-03-03 NOTE — PRE-ANESTHESIA EVALUATION ADULT - NSANTHOSAYNRD_GEN_A_CORE
No. SAUD screening performed.  STOP BANG Legend: 0-2 = LOW Risk; 3-4 = INTERMEDIATE Risk; 5-8 = HIGH Risk

## 2022-03-03 NOTE — PRE PROCEDURE NOTE - PRE PROCEDURE EVALUATION
Attending Physician:   Lainey Su MD                         Procedure:  Colonoscopy    Indication for Procedure:  history colon polyps  ________________________________________________________  PAST MEDICAL & SURGICAL HISTORY:  CAD (coronary artery disease)  Diabetes  HTN (hypertension)  HLD (hyperlipidemia)  BPH (benign prostatic hyperplasia)  Glaucoma  Stented coronary artery  Subdural hematoma, post-traumatic 2018  S/P CABG x 3 2008  Bilateral cataracts and glaucoma [id number]  S/P cataract extraction and insertion of intraocular lens  CAD S/P percutaneous coronary angioplasty  Glaucoma      ALLERGIES:  No Known Allergies    HOME MEDICATIONS:  CoQ10 100 mg oral capsule: 1 cap(s) orally once a day  Crestor 40 mg oral tablet: 1 tab(s) orally once a day  docusate sodium 100 mg oral capsule: 1 cap(s) orally once a day  Janumet 1000 mg-50 mg oral tablet: 1 tab(s) orally 2 times a day may resume 11/19/16    Jardiance 10 mg oral tablet: 1 tab(s) orally once a day (in the morning)  lisinopril 2.5 mg oral tablet: 1 tab(s) orally once a day  Lutein 6 mg oral capsule: 1 cap(s) orally once a day  multivitamin with minerals: 1 tab(s) orally once a day  Ranexa 1000 mg oral tablet, extended release: 1 tab(s) orally 2 times a day  tamsulosin 0.4 mg oral capsule: 1 cap(s) orally once a day    AICD/PPM: [ ] yes   [X] no    PERTINENT LAB DATA:    PHYSICAL EXAMINATION:    Height (cm): 172.7  Weight (kg): 64.4  BMI (kg/m2): 21.6  BSA (m2): 1.77T(C): --  HR: --  BP: --  RR: --  SpO2: --    Constitutional: NAD    Neck:  No JVD  Respiratory: CTAB/L  Cardiovascular: S1 and S2  Gastrointestinal: BS+, soft, NT/ND  Extremities: No peripheral edema  Neurological: A/O x 3, no focal deficits    COMMENTS:    The patient is a suitable candidate for the planned procedure unless box checked [ ]  No, explain:

## 2022-03-03 NOTE — ASU PATIENT PROFILE, ADULT - NSICDXPASTSURGICALHX_GEN_ALL_CORE_FT
PAST SURGICAL HISTORY:  Bilateral cataracts and glaucoma surgery  2009    CAD S/P percutaneous coronary angioplasty     Glaucoma     S/P CABG x 3 2008    S/P cataract extraction and insertion of intraocular lens

## 2022-03-08 LAB — SURGICAL PATHOLOGY STUDY: SIGNIFICANT CHANGE UP

## 2022-07-14 ENCOUNTER — OUTPATIENT (OUTPATIENT)
Dept: OUTPATIENT SERVICES | Facility: HOSPITAL | Age: 72
LOS: 1 days | End: 2022-07-14

## 2022-07-14 ENCOUNTER — APPOINTMENT (OUTPATIENT)
Dept: RADIOLOGY | Facility: HOSPITAL | Age: 72
End: 2022-07-14

## 2022-07-14 DIAGNOSIS — H40.9 UNSPECIFIED GLAUCOMA: Chronic | ICD-10-CM

## 2022-07-14 DIAGNOSIS — H26.9 UNSPECIFIED CATARACT: Chronic | ICD-10-CM

## 2022-07-14 DIAGNOSIS — Z98.49 CATARACT EXTRACTION STATUS, UNSPECIFIED EYE: Chronic | ICD-10-CM

## 2022-07-14 DIAGNOSIS — I25.10 ATHEROSCLEROTIC HEART DISEASE OF NATIVE CORONARY ARTERY WITHOUT ANGINA PECTORIS: Chronic | ICD-10-CM

## 2022-07-14 DIAGNOSIS — Z95.1 PRESENCE OF AORTOCORONARY BYPASS GRAFT: Chronic | ICD-10-CM

## 2022-07-14 DIAGNOSIS — R13.10 DYSPHAGIA, UNSPECIFIED: ICD-10-CM

## 2022-07-14 PROCEDURE — 74220 X-RAY XM ESOPHAGUS 1CNTRST: CPT | Mod: 26

## 2023-03-06 ENCOUNTER — APPOINTMENT (OUTPATIENT)
Dept: OPHTHALMOLOGY | Facility: CLINIC | Age: 73
End: 2023-03-06
Payer: MEDICARE

## 2023-03-06 ENCOUNTER — NON-APPOINTMENT (OUTPATIENT)
Age: 73
End: 2023-03-06

## 2023-03-06 PROCEDURE — 92004 COMPRE OPH EXAM NEW PT 1/>: CPT

## 2023-03-06 PROCEDURE — 92133 CPTRZD OPH DX IMG PST SGM ON: CPT

## 2023-03-06 PROCEDURE — 76514 ECHO EXAM OF EYE THICKNESS: CPT

## 2023-04-05 ENCOUNTER — APPOINTMENT (OUTPATIENT)
Dept: OPHTHALMOLOGY | Facility: CLINIC | Age: 73
End: 2023-04-05
Payer: MEDICARE

## 2023-04-05 ENCOUNTER — NON-APPOINTMENT (OUTPATIENT)
Age: 73
End: 2023-04-05

## 2023-04-05 PROCEDURE — 92012 INTRM OPH EXAM EST PATIENT: CPT

## 2023-06-22 RX ORDER — PREDNISONE 10 MG/1
10 TABLET ORAL
Qty: 30 | Refills: 3 | Status: ACTIVE | COMMUNITY
Start: 2021-09-15 | End: 1900-01-01

## 2023-09-28 ENCOUNTER — APPOINTMENT (OUTPATIENT)
Dept: UROLOGY | Facility: CLINIC | Age: 73
End: 2023-09-28
Payer: MEDICARE

## 2023-09-28 VITALS
SYSTOLIC BLOOD PRESSURE: 108 MMHG | WEIGHT: 135 LBS | DIASTOLIC BLOOD PRESSURE: 62 MMHG | HEIGHT: 67 IN | HEART RATE: 70 BPM | BODY MASS INDEX: 21.19 KG/M2

## 2023-09-28 DIAGNOSIS — N13.8 BENIGN PROSTATIC HYPERPLASIA WITH LOWER URINARY TRACT SYMPMS: ICD-10-CM

## 2023-09-28 DIAGNOSIS — Z78.9 OTHER SPECIFIED HEALTH STATUS: ICD-10-CM

## 2023-09-28 DIAGNOSIS — N40.1 BENIGN PROSTATIC HYPERPLASIA WITH LOWER URINARY TRACT SYMPMS: ICD-10-CM

## 2023-09-28 PROCEDURE — 99204 OFFICE O/P NEW MOD 45 MIN: CPT

## 2023-09-29 LAB
APPEARANCE: CLEAR
BACTERIA: NEGATIVE /HPF
BILIRUBIN URINE: NEGATIVE
BLOOD URINE: NEGATIVE
CAST: 0 /LPF
COLOR: YELLOW
EPITHELIAL CELLS: 0 /HPF
GLUCOSE QUALITATIVE U: >=1000 MG/DL
KETONES URINE: NEGATIVE MG/DL
LEUKOCYTE ESTERASE URINE: NEGATIVE
MICROSCOPIC-UA: NORMAL
NITRITE URINE: NEGATIVE
PH URINE: 6
PROTEIN URINE: NEGATIVE MG/DL
RED BLOOD CELLS URINE: 0 /HPF
SPECIFIC GRAVITY URINE: 1.03
UROBILINOGEN URINE: 0.2 MG/DL
WHITE BLOOD CELLS URINE: 0 /HPF

## 2023-09-30 LAB — BACTERIA UR CULT: NORMAL

## 2023-10-01 PROBLEM — M54.16 LUMBAR RADICULAR PAIN: Status: ACTIVE | Noted: 2021-07-06

## 2023-12-11 ENCOUNTER — APPOINTMENT (OUTPATIENT)
Dept: UROLOGY | Facility: CLINIC | Age: 73
End: 2023-12-11
Payer: MEDICARE

## 2023-12-11 PROCEDURE — 99213 OFFICE O/P EST LOW 20 MIN: CPT

## 2023-12-11 RX ORDER — ALFUZOSIN HYDROCHLORIDE 10 MG/1
10 TABLET, EXTENDED RELEASE ORAL
Qty: 90 | Refills: 3 | Status: ACTIVE | COMMUNITY
Start: 2023-09-28 | End: 1900-01-01

## 2024-06-13 ENCOUNTER — APPOINTMENT (OUTPATIENT)
Dept: UROLOGY | Facility: CLINIC | Age: 74
End: 2024-06-13

## 2025-02-25 ENCOUNTER — NON-APPOINTMENT (OUTPATIENT)
Age: 75
End: 2025-02-25

## 2025-02-26 ENCOUNTER — APPOINTMENT (OUTPATIENT)
Dept: ULTRASOUND IMAGING | Facility: IMAGING CENTER | Age: 75
End: 2025-02-26
Payer: MEDICARE

## 2025-02-26 ENCOUNTER — OUTPATIENT (OUTPATIENT)
Dept: OUTPATIENT SERVICES | Facility: HOSPITAL | Age: 75
LOS: 1 days | End: 2025-02-26
Payer: MEDICARE

## 2025-02-26 ENCOUNTER — APPOINTMENT (OUTPATIENT)
Dept: RADIOLOGY | Facility: IMAGING CENTER | Age: 75
End: 2025-02-26

## 2025-02-26 DIAGNOSIS — Z00.8 ENCOUNTER FOR OTHER GENERAL EXAMINATION: ICD-10-CM

## 2025-02-26 DIAGNOSIS — Z95.1 PRESENCE OF AORTOCORONARY BYPASS GRAFT: Chronic | ICD-10-CM

## 2025-02-26 DIAGNOSIS — H40.9 UNSPECIFIED GLAUCOMA: Chronic | ICD-10-CM

## 2025-02-26 PROCEDURE — 72110 X-RAY EXAM L-2 SPINE 4/>VWS: CPT | Mod: 26

## 2025-02-26 PROCEDURE — 76700 US EXAM ABDOM COMPLETE: CPT

## 2025-02-26 PROCEDURE — 72110 X-RAY EXAM L-2 SPINE 4/>VWS: CPT

## 2025-02-26 PROCEDURE — 76700 US EXAM ABDOM COMPLETE: CPT | Mod: 26

## (undated) DEVICE — IRRIGATOR BIO SHIELD

## (undated) DEVICE — TUBING SUCTION CONN 6FT STERILE

## (undated) DEVICE — BRUSH COLONOSCOPY CYTOLOGY

## (undated) DEVICE — SYR LUER LOK 50CC

## (undated) DEVICE — SNARE OVAL LOOP MICOR

## (undated) DEVICE — FORCEP RADIAL JAW 4 JUMBO 2.8MM 3.2MM 240CM ORANGE DISP

## (undated) DEVICE — BIOPSY FORCEP RADIAL JAW 4 STANDARD WITH NEEDLE

## (undated) DEVICE — CATH IV SAFE BC 20G X 1.16" (PINK)

## (undated) DEVICE — TUBING IV SET GRAVITY 3Y 100" MACRO

## (undated) DEVICE — CLAMP BX HOT RAD JAW 3

## (undated) DEVICE — PACK IV START WITH CHG

## (undated) DEVICE — SENSOR O2 FINGER ADULT 24/CA

## (undated) DEVICE — SUCTION YANKAUER NO CONTROL VENT

## (undated) DEVICE — CATH IV SAFE BC 22G X 1" (BLUE)

## (undated) DEVICE — ELCTR GROUNDING PAD ADULT COVIDIEN

## (undated) DEVICE — TUBING SUCTION 20FT

## (undated) DEVICE — FOLEY HOLDER STATLOCK 2 WAY ADULT

## (undated) DEVICE — POLY TRAP ETRAP

## (undated) DEVICE — SOL INJ NS 0.9% 500ML 2 PORT